# Patient Record
Sex: FEMALE | Race: WHITE | NOT HISPANIC OR LATINO | Employment: OTHER | URBAN - METROPOLITAN AREA
[De-identification: names, ages, dates, MRNs, and addresses within clinical notes are randomized per-mention and may not be internally consistent; named-entity substitution may affect disease eponyms.]

---

## 2017-12-28 PROBLEM — E66.9 OBESITY (BMI 30-39.9): Status: ACTIVE | Noted: 2017-12-28

## 2019-04-24 PROBLEM — R74.8 ELEVATED LIVER ENZYMES: Status: ACTIVE | Noted: 2019-04-24

## 2019-04-24 PROBLEM — R14.0 ABDOMINAL BLOATING: Status: ACTIVE | Noted: 2019-04-24

## 2019-04-24 PROBLEM — B08.5 ACUTE HERPANGINA: Status: ACTIVE | Noted: 2019-04-24

## 2019-04-24 PROBLEM — R10.9 RIGHT SIDED ABDOMINAL PAIN: Status: ACTIVE | Noted: 2019-04-24

## 2021-04-20 ENCOUNTER — HOSPITAL ENCOUNTER (OUTPATIENT)
Facility: MEDICAL CENTER | Age: 56
End: 2021-04-20
Attending: PATHOLOGY
Payer: COMMERCIAL

## 2021-04-20 DIAGNOSIS — Z00.6 RESEARCH STUDY PATIENT: ICD-10-CM

## 2021-04-30 LAB
ELF SCORE: 9
RELATIVE RISK: NORMAL
RISK GROUP: NORMAL
RISK: 3.3 %

## 2023-06-13 ENCOUNTER — TELEPHONE (OUTPATIENT)
Dept: CARDIOLOGY | Facility: MEDICAL CENTER | Age: 58
End: 2023-06-13

## 2023-06-13 NOTE — TELEPHONE ENCOUNTER
Caller: Antelmo    Topic/issue: Tawana is returning your call.     Callback Number: [phone]    Thank you,   Liss ALEXIS

## 2023-06-14 ENCOUNTER — RESEARCH ENCOUNTER (OUTPATIENT)
Dept: CARDIOLOGY | Facility: MEDICAL CENTER | Age: 58
End: 2023-06-14
Attending: INTERNAL MEDICINE
Payer: COMMERCIAL

## 2023-06-14 ENCOUNTER — TELEPHONE (OUTPATIENT)
Dept: CARDIOLOGY | Facility: MEDICAL CENTER | Age: 58
End: 2023-06-14

## 2023-06-14 VITALS
SYSTOLIC BLOOD PRESSURE: 122 MMHG | BODY MASS INDEX: 31.16 KG/M2 | WEIGHT: 187 LBS | RESPIRATION RATE: 16 BRPM | HEIGHT: 65 IN | HEART RATE: 88 BPM | OXYGEN SATURATION: 97 % | DIASTOLIC BLOOD PRESSURE: 88 MMHG

## 2023-06-14 DIAGNOSIS — Z00.6 RESEARCH STUDY PATIENT: Primary | ICD-10-CM

## 2023-06-14 DIAGNOSIS — E78.5 HYPERLIPIDEMIA, UNSPECIFIED HYPERLIPIDEMIA TYPE: ICD-10-CM

## 2023-06-14 DIAGNOSIS — I10 PRIMARY HYPERTENSION: ICD-10-CM

## 2023-06-14 DIAGNOSIS — Z00.6 RESEARCH STUDY PATIENT: ICD-10-CM

## 2023-06-14 PROCEDURE — 99213 OFFICE O/P EST LOW 20 MIN: CPT | Performed by: INTERNAL MEDICINE

## 2023-06-14 PROCEDURE — 3074F SYST BP LT 130 MM HG: CPT | Performed by: INTERNAL MEDICINE

## 2023-06-14 PROCEDURE — 3079F DIAST BP 80-89 MM HG: CPT | Performed by: INTERNAL MEDICINE

## 2023-06-14 PROCEDURE — 99212 OFFICE O/P EST SF 10 MIN: CPT | Performed by: INTERNAL MEDICINE

## 2023-06-14 PROCEDURE — 99203 OFFICE O/P NEW LOW 30 MIN: CPT | Performed by: INTERNAL MEDICINE

## 2023-06-14 PROCEDURE — 93005 ELECTROCARDIOGRAM TRACING: CPT | Performed by: INTERNAL MEDICINE

## 2023-06-14 ASSESSMENT — FIBROSIS 4 INDEX: FIB4 SCORE: 2.14

## 2023-06-14 NOTE — PROGRESS NOTES
CARDIOLOGY RESEARCH STUDY NOTE      Date of Visit: 6/14/2023    Primary Care Provider: Johana Lama D.O.    Patient Name: Tawana Thomas  YOB: 1965  MRN: [id number]     Reason for Visit:   Safety assessment for [place] Plaque study     Patient Story:   Tawana Thomas is a 58 year-old woman with a past medical history of hyperlipidemia and coronary artery disease.  She presents today for her safety assessment/intake physical examination to participate in the [place] Plaque study.    She reports feeling well today with no acute cardiac complaints.  She denies significant exertional shortness of breath or chest discomfort.  She denies any known history of prior myocardial infarction.  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  She reports good compliance and tolerance of her atorvastatin and ezetimibe, but has failed to achieve optimal LDL control on these.     Medications and Allergies:     Current Outpatient Medications   Medication Sig Dispense Refill    Calcium Carbonate-Vit D-Min (CALTRATE PLUS PO) Take 1,200 mg by mouth every day.      fluticasone (FLONASE) 50 MCG/ACT nasal spray 1 to 2 sprays per nostril daily for allergies 15.8 mL 2    OZEMPIC, 1 MG/DOSE, 4 MG/3ML Solution Pen-injector INJECT 1 MG UNDER THE SKIN EVERY 7 DAYS (3/14/2023) 3 mL 1    ezetimibe (ZETIA) 10 MG Tab Take 1 Tablet by mouth every day. 90 Tablet 3    hydroCHLOROthiazide (HYDRODIURIL) 12.5 MG tablet TAKE ONE TABLET BY MOUTH EVERY DAY 90 Tablet 1    atorvastatin (LIPITOR) 80 MG tablet TAKE ONE TABLET BY MOUTH EVERY EVENING 90 Tablet 1    levothyroxine (SYNTHROID) 100 MCG Tab TAKE ONE TABLET BY MOUTH EVERY DAY 90 Tablet 3    aspirin EC (ECOTRIN) 81 MG Tablet Delayed Response Take 1 Tablet by mouth every day. 30 Tablet     losartan (COZAAR) 100 MG Tab take 1 tablet by mouth once daily 90 Tablet 3    PREBIOTIC PRODUCT PO Take  by mouth.      Cholecalciferol (VITAMIN D3) 2000 UNIT CAPS Take 1 Cap by  "mouth every day.       No current facility-administered medications for this visit.     Allergies   Allergen Reactions    Penicillins     Sulfa Drugs       Medical Decision Making:   The patient was evaluated for inclusion in the [facility]   Plaque study.  She has no concerning physical examination findings or symptoms.  She meets inclusion/exclusion criteria for the study.  She has been scheduled for her screening CTCA.  Will order require baseline laboratories today.    Follow Up  Per study protocol     Cardiac Studies and Procedures:   CT/MRI  CAC (9/27/2022)  LM: 59  LAD:0  LCx: 0  RCA: 0  PDA: 0  The total Agatston CAC score is 59.    Electrophysiology  ECG (6/14/2023)  Normal sinus rhythm, early R wave transition     Vital Signs:   /88 (BP Location: Left arm, Patient Position: Sitting)   Pulse 88   Resp 16   Ht 1.651 m (5' 5\")   Wt 84.8 kg (187 lb)   SpO2 97%    BP Readings from Last 4 Encounters:   06/14/23 122/88   04/07/23 132/82   03/02/23 135/86   12/09/22 108/62     Wt Readings from Last 4 Encounters:   06/14/23 84.8 kg (187 lb)   04/07/23 85.7 kg (189 lb)   12/09/22 84.8 kg (187 lb)   12/08/22 83.9 kg (185 lb)     Body mass index is 31.12 kg/m².     Laboratories:   Lipids  Lab Results   Component Value Date/Time     (H) 04/21/2023 07:18 AM     (H) 08/31/2022 07:26 AM    LDL 99 12/27/2021 07:30 AM     (H) 04/27/2021 07:14 AM    LDL 86 11/13/2020 07:15 AM       Lab Results   Component Value Date/Time    HDL 80.0 (H) 04/21/2023 07:18 AM    HDL 77.0 (H) 08/31/2022 07:26 AM    HDL 70.0 (H) 12/27/2021 07:30 AM    HDL 54.0 04/27/2021 07:14 AM    HDL 61.0 (H) 11/13/2020 07:15 AM       Lab Results   Component Value Date/Time    TRIGLYCERIDE 77 04/21/2023 07:18 AM    TRIGLYCERIDE 85 08/31/2022 07:26 AM    TRIGLYCERIDE 149 12/27/2021 07:30 AM    TRIGLYCERIDE 116 04/27/2021 07:14 AM    TRIGLYCERIDE 127 11/13/2020 07:15 AM       Lab Results   Component Value Date/Time    CHOLSTRLTOT " 199 04/21/2023 07:18 AM    CHOLSTRLTOT 219 (H) 08/31/2022 07:26 AM    CHOLSTRLTOT 199 12/27/2021 07:30 AM    CHOLSTRLTOT 181 04/27/2021 07:14 AM    CHOLSTRLTOT 172 11/13/2020 07:15 AM       No components found for: LPA      Chemistries  Lab Results   Component Value Date/Time    CREATININE 0.7 04/21/2023 07:18 AM    CREATININE 0.8 08/31/2022 07:26 AM    CREATININE 0.7 12/27/2021 07:30 AM    CREATININE 0.9 04/27/2021 07:14 AM    CREATININE 0.9 11/13/2020 07:15 AM    CREATININE 0.71 05/10/2013 08:33 AM     Lab Results   Component Value Date/Time    BUN 12 04/21/2023 07:18 AM    BUN 12 08/31/2022 07:26 AM    BUN 13 12/27/2021 07:30 AM    BUN 12 04/27/2021 07:14 AM    BUN 8 11/13/2020 07:15 AM     Lab Results   Component Value Date/Time    POTASSIUM 3.7 04/21/2023 07:18 AM    POTASSIUM 4.3 08/31/2022 07:26 AM    POTASSIUM 4.0 12/27/2021 07:30 AM     Lab Results   Component Value Date/Time    SODIUM 135 (L) 04/21/2023 07:18 AM    SODIUM 139 08/31/2022 07:26 AM    SODIUM 136 12/27/2021 07:30 AM     Lab Results   Component Value Date/Time    GLUCOSE 86 04/21/2023 07:18 AM    GLUCOSE 96 08/31/2022 07:26 AM    GLUCOSE 88 12/27/2021 07:30 AM     Lab Results   Component Value Date/Time    ASTSGOT 71 (H) 04/21/2023 07:18 AM    ASTSGOT 97 (H) 08/31/2022 07:26 AM    ASTSGOT 89 (H) 12/27/2021 07:30 AM     Lab Results   Component Value Date/Time    ALTSGPT 55 04/21/2023 07:18 AM    ALTSGPT 68 08/31/2022 07:26 AM    ALTSGPT 74 12/27/2021 07:30 AM     Lab Results   Component Value Date/Time    ALKPHOSPHAT 109 04/21/2023 07:18 AM    ALKPHOSPHAT 119 (H) 08/31/2022 07:26 AM    ALKPHOSPHAT 134 (H) 12/27/2021 07:30 AM     Lab Results   Component Value Date/Time    HBA1C 5.6 12/20/2022 07:24 AM    HBA1C 5.4 08/31/2022 07:26 AM     Lab Results   Component Value Date/Time    TSH 2.860 10/22/2014 07:11 AM    TSH 2.490 03/17/2014 07:06 AM     No results found for: NTPROBNP  No results found for: TROPONINT    Blood Counts  Lab Results    Component Value Date/Time    HEMOGLOBIN 13.6 08/31/2022 07:26 AM    HEMOGLOBIN 12.8 (L) 12/27/2021 07:30 AM    HEMOGLOBIN 13.7 04/27/2021 07:14 AM     Lab Results   Component Value Date/Time    PLATELETCT 269 08/31/2022 07:26 AM    PLATELETCT 253 12/27/2021 07:30 AM    PLATELETCT 294 04/27/2021 07:14 AM     Lab Results   Component Value Date/Time    WBC 4.8 08/31/2022 07:26 AM    WBC 4.8 12/27/2021 07:30 AM    WBC 5.4 04/27/2021 07:14 AM        Physical Examination:   General: Well-appearing, no acute distress  Eyes: Extraocular movements intact, anicteric  Throat: Normal tongue alignment, clear oropharynx  Neck: Full range of motion, no jugular venous distension, no lymphadenopathy, no goiter  Ears: No lesions  Skin: No rashes on exposed skin, bandage over left mandible  Pulmonary: Normal respiratory effort, clear to auscultation bilaterally  Cardiovascular: Regular rate and rhythm, no murmurs or gallops appreciated, 2+ bilateral PT pulses  Gastrointestinal: Soft, non-tender, non-distended  Extremities: Warm and well perfused, no lower extremity edema  Neurological: Alert and oriented, no gross focal motor deficits  Psychiatric: Normal affect, normal judgment     Past History:   Past Medical History  The patient's past medical history was reviewed.  See HPI and self-reported patient medical history form for pertinent medical history to consultation.    Past Social History  The patient's social history was reviewed.  See [facility] self-reported patient medical history form for pertinent social history to consultation.    Past Family History  The patient's family history was reviewed.  See [facility] self-reported patient medical history form for pertinent family history to consultation.    Review of Systems  A pertinent cardiac review of systems was performed and was otherwise unremarkable except as per HPI and self-reported patient medical history form.        Gilles Clemens MD, [facility]  Interventional Cardiology  [facility]  [facility], [facility]  [address] [place] [address]  [place] [address]  Phone: [phone]  Fax: [phone]

## 2023-06-14 NOTE — RESEARCH NOTE
Name: Tawana Thomas   MRN: [id number]  Participant ID:  [id number]  : 1965  Visit Date/Time: 2023 10:00 AM      Study:      [id number] - [facility]aque   Status Consented/Enrolled (2023)   Active Start Date 23   Participant ID [id number]   Coordinator Lilia Fisher; Lilia Casillas; Kate Zepeda   IRB [id number]   NCT [id number]    Aaron Durán M.D.      Screening/Consent note:    Participation in the [facility] clinical trial was discussed with Tawana today. All aspects of the study purpose and procedures were explained.  She was given ample time to review the consent and all questions were answered to her satisfaction. Patient aware that the clinical trial is voluntary and she may withdraw consent at any time without affecting the level of care they receive.  Subject signed consent without coercion and undue influence and was given a copy of the signed consent. No study-related procedures took place prior to consenting and all assessments were conducted per protocol.     She did consent to have optional genetics sample be collected for this study.     Complete Physical Exam conducted by , see his note.     ConMeds and Med Hx reviewed. Tawana is currently on stable max statin of atorvastatin 80mg qd so she can skip the statin optimization period of study.     She is interested in the [employer] [facility] card program if CCTA qualifies.      At this point, she meets all inclusion and no exclusion criteria with CCTA to be scheduled. Pt is unavailable 7/3-    Vitals:  BP OMRON reading left arm after sitting for 5 minutes, 1-2 min apart  125/94 HR 90  110/88, HR 95  116/86, HR 94    Ht 165.1 cm  Wt 84.8 kg  Shoes were removed for HT and WT.    Labs:  Study labs will be drawn this week at a [facility] facility                Recent Results (from the past 336 hour(s))   EKG    Collection Time: 23 11:13 AM   Result Value Ref Range    Report       [facility]    Test Date:  2023  Pt Name:    RUDI GONZALEZ                Department: [facility]  MRN:        [id number]                      Room:  Gender:     Female                       Technician: GRAHAM  :        1965                   Requested By:DOMINGO LOMBARDI  Order #:    [id number]                    Reading MD:    Measurements  Intervals                                Axis  Rate:       88                           P:          -4  AR:         136                          QRS:        -17  QRSD:       86                           T:          11  QT:         350  QTc:        424    Interpretive Statements  Sinus rhythm  Borderline left axis deviation  Abnormal R-wave progression, early transition  No previous ECG available for comparison           Meds:      Current Outpatient Medications   Medication Sig Dispense Refill    Calcium Carbonate-Vit D-Min (CALTRATE PLUS PO) Take 1,200 mg by mouth every day.      fluticasone (FLONASE) 50 MCG/ACT nasal spray 1 to 2 sprays per nostril daily for allergies 15.8 mL 2    OZEMPIC, 1 MG/DOSE, 4 MG/3ML Solution Pen-injector INJECT 1 MG UNDER THE SKIN EVERY 7 DAYS (3/14/2023) 3 mL 1    ezetimibe (ZETIA) 10 MG Tab Take 1 Tablet by mouth every day. 90 Tablet 3    hydroCHLOROthiazide (HYDRODIURIL) 12.5 MG tablet TAKE ONE TABLET BY MOUTH EVERY DAY 90 Tablet 1    atorvastatin (LIPITOR) 80 MG tablet TAKE ONE TABLET BY MOUTH EVERY EVENING 90 Tablet 1    levothyroxine (SYNTHROID) 100 MCG Tab TAKE ONE TABLET BY MOUTH EVERY DAY 90 Tablet 3    aspirin EC (ECOTRIN) 81 MG Tablet Delayed Response Take 1 Tablet by mouth every day. 30 Tablet     losartan (COZAAR) 100 MG Tab take 1 tablet by mouth once daily 90 Tablet 3    PREBIOTIC PRODUCT PO Take  by mouth.      Cholecalciferol (VITAMIN D3) 2000 UNIT CAPS Take 1 Cap by mouth every day.       No current facility-administered medications for this visit.    current meds    History:    Past Medical History:   Diagnosis Date     "Childhood asthma     Difficulty with CPAP use     History of mammogram     2012    Hyperlipidemia     Hypertension     Hypothyroidism     Insulin resistance     Menorrhagia     Sleep apnea        Past Surgical History:   Procedure Laterality Date    ABDOMINAL HYSTERECTOMY TOTAL  3/23/15    ovaries spared    APPENDECTOMY      (1977)    MYOMECTOMY      (2001)    OTHER      \"Rollerball\" (2006)        Social History     Tobacco Use    Smoking status: Never    Smokeless tobacco: Never   Vaping Use    Vaping Use: Never used   Substance Use Topics    Alcohol use: Yes     Alcohol/week: 4.2 oz     Types: 7 Glasses of wine per week    Drug use: No       Family History   Problem Relation Age of Onset    Other Mother         Paget's disease of breast and alzheimer dementia    Heart Attack Mother         MI at 65    Other Father         MI    Heart Failure Brother     Other Other         Breast cancer (aunt)           Follow-up: CCTA/Baseline Visit to be scheduled      "

## 2023-06-14 NOTE — TELEPHONE ENCOUNTER
Patient in office for appointment. Faxed STAT records request to Dr. Feliberto Falk [facility]:    Fax# [phone]  Ph# [phone]      Dr. Trent  [place] :    Fax# [phone]  Ph# [phone]

## 2023-06-15 LAB — EKG IMPRESSION: NORMAL

## 2023-06-15 PROCEDURE — 93010 ELECTROCARDIOGRAM REPORT: CPT | Performed by: INTERNAL MEDICINE

## 2023-06-19 ENCOUNTER — HOSPITAL ENCOUNTER (OUTPATIENT)
Dept: LAB | Facility: MEDICAL CENTER | Age: 58
End: 2023-06-19
Attending: INTERNAL MEDICINE
Payer: COMMERCIAL

## 2023-06-19 DIAGNOSIS — Z00.6 RESEARCH STUDY PATIENT: ICD-10-CM

## 2023-06-19 LAB
ALT SERPL-CCNC: 36 U/L (ref 2–50)
AST SERPL-CCNC: 47 U/L (ref 12–45)
CREAT SERPL-MCNC: 0.6 MG/DL (ref 0.5–1.4)
GFR SERPLBLD CREATININE-BSD FMLA CKD-EPI: 102 ML/MIN/1.73 M 2

## 2023-06-19 PROCEDURE — 82565 ASSAY OF CREATININE: CPT

## 2023-06-19 PROCEDURE — 84450 TRANSFERASE (AST) (SGOT): CPT

## 2023-06-19 PROCEDURE — 36415 COLL VENOUS BLD VENIPUNCTURE: CPT

## 2023-06-19 PROCEDURE — 84460 ALANINE AMINO (ALT) (SGPT): CPT

## 2023-06-19 PROCEDURE — 83721 ASSAY OF BLOOD LIPOPROTEIN: CPT

## 2023-06-19 RX ORDER — METOPROLOL SUCCINATE 25 MG/1
25 TABLET, EXTENDED RELEASE ORAL PRN
Qty: 2 TABLET | Refills: 0 | Status: SHIPPED | OUTPATIENT
Start: 2023-06-19 | End: 2023-07-03 | Stop reason: SDUPTHER

## 2023-06-20 LAB — LDLC SERPL-MCNC: 100 MG/DL (ref 0–129)

## 2023-06-27 ENCOUNTER — APPOINTMENT (OUTPATIENT)
Dept: CARDIOLOGY | Facility: MEDICAL CENTER | Age: 58
End: 2023-06-27
Attending: INTERNAL MEDICINE
Payer: COMMERCIAL

## 2023-06-27 ENCOUNTER — HOSPITAL ENCOUNTER (OUTPATIENT)
Dept: RADIOLOGY | Facility: MEDICAL CENTER | Age: 58
End: 2023-06-27
Attending: INTERNAL MEDICINE
Payer: COMMERCIAL

## 2023-06-27 DIAGNOSIS — Z00.6 RESEARCH STUDY PATIENT: ICD-10-CM

## 2023-06-29 DIAGNOSIS — I10 PRIMARY HYPERTENSION: ICD-10-CM

## 2023-06-29 NOTE — TELEPHONE ENCOUNTER
Please advised , patient requesting refills for the following.     metoprolol SR (TOPROL XL) 25 MG TABLET SR 24 HR  Sig - Route: Take 1 Tablet by mouth as needed (Take one tablet the night before and one tablet the morning of your CT scan.). - Oral

## 2023-06-30 RX ORDER — METOPROLOL SUCCINATE 25 MG/1
25 TABLET, EXTENDED RELEASE ORAL PRN
Qty: 2 TABLET | Refills: 0 | OUTPATIENT
Start: 2023-06-30

## 2023-07-03 DIAGNOSIS — E78.5 HYPERLIPIDEMIA, UNSPECIFIED HYPERLIPIDEMIA TYPE: ICD-10-CM

## 2023-07-03 DIAGNOSIS — Z00.6 RESEARCH STUDY PATIENT: ICD-10-CM

## 2023-07-03 RX ORDER — METOPROLOL SUCCINATE 25 MG/1
25 TABLET, EXTENDED RELEASE ORAL PRN
Qty: 2 TABLET | Refills: 0 | Status: SHIPPED | OUTPATIENT
Start: 2023-07-03 | End: 2023-07-14 | Stop reason: SDUPTHER

## 2023-07-06 ENCOUNTER — HOSPITAL ENCOUNTER (OUTPATIENT)
Dept: RADIOLOGY | Facility: MEDICAL CENTER | Age: 58
End: 2023-07-06
Attending: INTERNAL MEDICINE

## 2023-07-06 ENCOUNTER — APPOINTMENT (OUTPATIENT)
Dept: CARDIOLOGY | Facility: MEDICAL CENTER | Age: 58
End: 2023-07-06
Attending: NURSE PRACTITIONER

## 2023-07-14 DIAGNOSIS — Z00.6 RESEARCH STUDY PATIENT: ICD-10-CM

## 2023-07-14 DIAGNOSIS — E78.5 HYPERLIPIDEMIA, UNSPECIFIED HYPERLIPIDEMIA TYPE: ICD-10-CM

## 2023-07-14 RX ORDER — METOPROLOL SUCCINATE 25 MG/1
25 TABLET, EXTENDED RELEASE ORAL PRN
Qty: 2 TABLET | Refills: 0 | Status: SHIPPED
Start: 2023-07-14 | End: 2023-07-20

## 2023-07-20 ENCOUNTER — HOSPITAL ENCOUNTER (OUTPATIENT)
Dept: RADIOLOGY | Facility: MEDICAL CENTER | Age: 58
End: 2023-07-20
Attending: INTERNAL MEDICINE

## 2023-07-20 ENCOUNTER — RESEARCH ENCOUNTER (OUTPATIENT)
Dept: CARDIOLOGY | Facility: MEDICAL CENTER | Age: 58
End: 2023-07-20

## 2023-07-20 ENCOUNTER — OFFICE VISIT (OUTPATIENT)
Dept: CARDIOLOGY | Facility: MEDICAL CENTER | Age: 58
End: 2023-07-20
Attending: NURSE PRACTITIONER
Payer: COMMERCIAL

## 2023-07-20 VITALS
WEIGHT: 190 LBS | BODY MASS INDEX: 31.65 KG/M2 | OXYGEN SATURATION: 95 % | RESPIRATION RATE: 18 BRPM | HEART RATE: 73 BPM | SYSTOLIC BLOOD PRESSURE: 118 MMHG | HEIGHT: 65 IN | DIASTOLIC BLOOD PRESSURE: 84 MMHG

## 2023-07-20 DIAGNOSIS — Z00.6 RESEARCH STUDY PATIENT: ICD-10-CM

## 2023-07-20 DIAGNOSIS — E78.5 HYPERLIPIDEMIA, UNSPECIFIED HYPERLIPIDEMIA TYPE: ICD-10-CM

## 2023-07-20 PROCEDURE — 700117 HCHG RX CONTRAST REV CODE 255: Performed by: INTERNAL MEDICINE

## 2023-07-20 PROCEDURE — 99214 OFFICE O/P EST MOD 30 MIN: CPT | Performed by: NURSE PRACTITIONER

## 2023-07-20 PROCEDURE — 3074F SYST BP LT 130 MM HG: CPT | Performed by: NURSE PRACTITIONER

## 2023-07-20 PROCEDURE — 99213 OFFICE O/P EST LOW 20 MIN: CPT | Performed by: NURSE PRACTITIONER

## 2023-07-20 PROCEDURE — 99212 OFFICE O/P EST SF 10 MIN: CPT | Performed by: NURSE PRACTITIONER

## 2023-07-20 PROCEDURE — 700102 HCHG RX REV CODE 250 W/ 637 OVERRIDE(OP): Performed by: RADIOLOGY

## 2023-07-20 PROCEDURE — 75574 CT ANGIO HRT W/3D IMAGE: CPT

## 2023-07-20 PROCEDURE — 3079F DIAST BP 80-89 MM HG: CPT | Performed by: NURSE PRACTITIONER

## 2023-07-20 PROCEDURE — A9270 NON-COVERED ITEM OR SERVICE: HCPCS | Performed by: RADIOLOGY

## 2023-07-20 RX ORDER — NITROGLYCERIN 0.4 MG/1
0.4 TABLET SUBLINGUAL
Status: DISCONTINUED | OUTPATIENT
Start: 2023-07-20 | End: 2023-07-21 | Stop reason: HOSPADM

## 2023-07-20 RX ADMIN — IOHEXOL 70 ML: 350 INJECTION, SOLUTION INTRAVENOUS at 10:45

## 2023-07-20 RX ADMIN — NITROGLYCERIN 0.4 MG: 0.4 TABLET, ORALLY DISINTEGRATING SUBLINGUAL at 08:43

## 2023-07-20 ASSESSMENT — FIBROSIS 4 INDEX: FIB4 SCORE: 1.75

## 2023-07-20 NOTE — PROGRESS NOTES
CTA EXAMINATION:  Patient had CTA.  Patient brought to preparation room.   Verbal consent given by patient for PIV and administration of NTG medications by RN.  PIV initiated, 20G R AC    Review of medications, protocol, and NPO status.   Education provided to patient regarding examination.  Patient given baseline 3 lead EK  HR: SR 72  BP: 140/94  RR 20   96% RA     Patient ready for CT scan  Vitals: 0840  BP:137/96  HR: SR 82  RR18  98% RA    Administration of Sublingual NTG given per CTA protocol at 0843--See MAR  Vitals: 0843  BP:118/77  HR: SR 75  RR18  97% RA    Vitals: 0850  BP:116/70  HR: SR 82  RR 20  97% RA    Patient returned to preparation area where post procedure education given. PIV removed, patient provided 1 large bottle of water.     Vitals returned to baseline. Patient stated ready to go back. Discharge education provided. Discharged per protocol.     Patient ambulated self, escorted by RN to [facility].

## 2023-07-20 NOTE — RESEARCH NOTE
Name: Tawana Thomas   MRN: [id number]  Participant ID:  [id number]  : 1965  Visit Date/Time: 2023 9:30 AM      Study:    [id number] - [facility] Plaque   Status Consented/Enrolled (2023)   Active Start Date 23   Participant ID [id number]   Coordinator Lilia Fisher; Lilia Casillas; Kate Zepeda   IRB [id number]   [facility] [id number]    Aaron Durán M.D.     Study Note:     Met with Tawana right after her study required CCTA scan. All required images were uploaded to the study [employer] website with confirmation they were sent.    This visit is outside of window due to CCTA on  cancelled because pt drank caffeine and rescheduled CCTA date cancelled because of [facility] CT machine being down.     She is feeling well with no AE's to report.     Physical exam done by CECIL Mccormick, see her note.    Vitals:  BP OMRON reading after sitting quietly for 5 minutes, 1-2 minutes apart  Sitting quietly starting 10:15am  153/109 HR 67 @ 10:20am  148/102 HR 65 @ 10:22am  135/99 HR 68 @ 10:24am    .1cm without shoes  WT 86.18 kg    Central Labs:    Drawn today per protocol at 9:20am. Ambient and refrigerated samples sent today tracking #[id number]. SOMA Plasma-1 and 2 not sent due to sample being completely hemolyzed and unable to separate plasma.  Frozen labs sent on 23 tracking # [id number].        Medical History with start dates:  Hypertension     Hyperlipidemia 16/MAY//2013  Hypothyroidism   Obesity            Meds:  ConMeds reviewed with the below start dates  Cozaar 100mg qd   HCTZ 12.5mg qd   Zetia 10mg qd 11/MAR/2016  Atorvastatin 80mg 11/MAR/2016  Levothyroxine 100mcg qd   Semaglutide 1mg qd 2/MAR/2023  Aspirin 81mg 9/DEC/2022      Current Outpatient Medications   Medication Sig Dispense Refill    Probiotic Product (PROBIOTIC PO) Take  by mouth every day.      Calcium Carbonate-Vit  "D-Min (CALTRATE PLUS PO) Take 1,200 mg by mouth every day.      fluticasone (FLONASE) 50 MCG/ACT nasal spray 1 to 2 sprays per nostril daily for allergies 15.8 mL 2    OZEMPIC, 1 MG/DOSE, 4 MG/3ML Solution Pen-injector INJECT 1 MG UNDER THE SKIN EVERY 7 DAYS (3/14/2023) 3 mL 1    ezetimibe (ZETIA) 10 MG Tab Take 1 Tablet by mouth every day. 90 Tablet 3    hydroCHLOROthiazide (HYDRODIURIL) 12.5 MG tablet TAKE ONE TABLET BY MOUTH EVERY DAY 90 Tablet 1    atorvastatin (LIPITOR) 80 MG tablet TAKE ONE TABLET BY MOUTH EVERY EVENING 90 Tablet 1    levothyroxine (SYNTHROID) 100 MCG Tab TAKE ONE TABLET BY MOUTH EVERY DAY 90 Tablet 3    aspirin EC (ECOTRIN) 81 MG Tablet Delayed Response Take 1 Tablet by mouth every day. 30 Tablet     losartan (COZAAR) 100 MG Tab take 1 tablet by mouth once daily 90 Tablet 3    PREBIOTIC PRODUCT PO Take  by mouth.      Cholecalciferol (VITAMIN D3) 2000 UNIT CAPS Take 1 Cap by mouth every day.       No current facility-administered medications for this visit.     Facility-Administered Medications Ordered in Other Visits   Medication Dose Route Frequency Provider Last Rate Last Admin    nitroglycerin (Nitrostat) tablet 0.4 mg  0.4 mg Sublingual Q5 MIN PRN Tammie Sanders M.D.   0.4 mg at 07/20/23 0843    current meds    History:    Past Medical History:   Diagnosis Date    Childhood asthma     Difficulty with CPAP use     History of mammogram     2012    Hyperlipidemia     Hypertension     Hypothyroidism     Insulin resistance     Menorrhagia     Sleep apnea        Past Surgical History:   Procedure Laterality Date    ABDOMINAL HYSTERECTOMY TOTAL  3/23/15    ovaries spared    APPENDECTOMY      (1977)    MYOMECTOMY      (2001)    OTHER      \"Rollerball\" (2006)        Social History     Tobacco Use    Smoking status: Never    Smokeless tobacco: Never   Vaping Use    Vaping Use: Never used   Substance Use Topics    Alcohol use: Yes     Alcohol/week: 4.2 oz     Types: 7 Glasses of wine per week "    Drug use: No       Family History   Problem Relation Age of Onset    Other Mother         Paget's disease of breast and alzheimer dementia    Heart Attack Mother         MI at 65    Other Father         MI    Heart Failure Brother     Other Other         Breast cancer (aunt)         Procedures:     EKG:   Results for orders placed or performed in visit on 23   EKG   Result Value Ref Range    Report       [facility] [place] [facility]    Test Date:  2023  Pt Name:    RUDI GONZALEZ                Department: [facility]  MRN:        [id number]                      Room:  Gender:     Female                       Technician: GRAHAM  :        1965                   Requested By:DOMINGO CLEMENS  Order #:    [id number]                    Reading MD: Domingo Clemens MD    Measurements  Intervals                                Axis  Rate:       88                           P:          -4  WA:         136                          QRS:        -17  QRSD:       86                           T:          11  QT:         350  QTc:        424    Interpretive Statements  Sinus rhythm  Abnormal R-wave progression, early transition, consider V2/V3 reversal  Electronically Signed On 6- 16:33:21 PDT by Domingo Clemens MD                 Follow-up: Randomization visit 8/3/23 if CCTA qualifies.

## 2023-07-20 NOTE — PROGRESS NOTES
[facility] Cardiology Note      Patient's PCP: Johana Lama D.O.    CC:   Chief Complaint   Patient presents with    Hyperlipidemia    Hypertension       HPI: aTwana Thomas 57 y.o. female for cardiology follow-up regarding elevated CAC score, premature family history of CAD, HTN, DLD.  Has additional medical problems of hypothyroid, history of rheumatic fever.    Cardiovascular Risk Factors:  1. Smoking status: Never  2. Type II Diabetes Mellitus: Not present.  We will check  3. Hypertension: Present  4. Dyslipidemia: Present  5. Family history of early Coronary Artery Disease in a first degree relative (Male less than 55 years of age; Female less than 65 years of age): Father MI at 65.  Mother sudden death at 75   6.  Obesity and/or Metabolic Syndrome: BMI 31  7. Sedentary lifestyle: Present  8. Substance Abuse (ETOH, Caffine, Recreational substances): Denies excessive use  9. Hx of CKD or autoimmune diseases (lupus or RA): Not present    Today, Patient feels well, denies chest pain, shortness of breath, palpitations, dizziness/lightheadedness, orthopnea, PND or Edema.     Patient is interested in [facility] plaque research trial for inclisiran versus placebo.  Patient undergoing current screening protocol.  CCTA completed this morning.  Questions regarding trial answered.  Questions regarding unverified vascular studies answered to the best my knowledge.  We will have patient follow-up per protocol.      Current list of administered Medications:    Current Outpatient Medications:     Probiotic Product (PROBIOTIC PO), Take  by mouth every day., Disp: , Rfl:     Calcium Carbonate-Vit D-Min (CALTRATE PLUS PO), Take 1,200 mg by mouth every day., Disp: , Rfl:     fluticasone (FLONASE) 50 MCG/ACT nasal spray, 1 to 2 sprays per nostril daily for allergies, Disp: 15.8 mL, Rfl: 2    OZEMPIC, 1 MG/DOSE, 4 MG/3ML Solution Pen-injector, INJECT 1 MG UNDER THE SKIN EVERY 7 DAYS (3/14/2023), Disp: 3 mL, Rfl: 1    ezetimibe (ZETIA)  "10 MG Tab, Take 1 Tablet by mouth every day., Disp: 90 Tablet, Rfl: 3    hydroCHLOROthiazide (HYDRODIURIL) 12.5 MG tablet, TAKE ONE TABLET BY MOUTH EVERY DAY, Disp: 90 Tablet, Rfl: 1    atorvastatin (LIPITOR) 80 MG tablet, TAKE ONE TABLET BY MOUTH EVERY EVENING, Disp: 90 Tablet, Rfl: 1    levothyroxine (SYNTHROID) 100 MCG Tab, TAKE ONE TABLET BY MOUTH EVERY DAY, Disp: 90 Tablet, Rfl: 3    aspirin EC (ECOTRIN) 81 MG Tablet Delayed Response, Take 1 Tablet by mouth every day., Disp: 30 Tablet, Rfl:     losartan (COZAAR) 100 MG Tab, take 1 tablet by mouth once daily, Disp: 90 Tablet, Rfl: 3    PREBIOTIC PRODUCT PO, Take  by mouth., Disp: , Rfl:     Cholecalciferol (VITAMIN D3) 2000 UNIT CAPS, Take 1 Cap by mouth every day., Disp: , Rfl:     metoprolol SR (TOPROL XL) 25 MG TABLET SR 24 HR, Take 1 Tablet by mouth as needed (Take one tablet the night before and one tablet the morning of your CT scan.). (Patient not taking: Reported on 7/20/2023), Disp: 2 Tablet, Rfl: 0  No current facility-administered medications for this visit.    Facility-Administered Medications Ordered in Other Visits:     nitroglycerin (Nitrostat) tablet 0.4 mg, 0.4 mg, Sublingual, Q5 MIN PRN, Tammie Sanders M.D., 0.4 mg at 07/20/23 0843    Past Medical History:   Diagnosis Date    Childhood asthma     Difficulty with CPAP use     History of mammogram     2012    Hyperlipidemia     Hypertension     Hypothyroidism     Insulin resistance     Menorrhagia     Sleep apnea        Past Surgical History:   Procedure Laterality Date    ABDOMINAL HYSTERECTOMY TOTAL  3/23/15    ovaries spared    APPENDECTOMY      (1977)    MYOMECTOMY      (2001)    OTHER      \"Rollerball\" (2006)       Family History   Problem Relation Age of Onset    Other Mother         Paget's disease of breast and alzheimer dementia    Heart Attack Mother         MI at 65    Other Father         MI    Heart Failure Brother     Other Other         Breast cancer (aunt)         Social " "History     Tobacco Use    Smoking status: Never    Smokeless tobacco: Never   Vaping Use    Vaping Use: Never used   Substance Use Topics    Alcohol use: Yes     Alcohol/week: 4.2 oz     Types: 7 Glasses of wine per week    Drug use: No       ALLERGIES:  Allergies   Allergen Reactions    Penicillins     Sulfa Drugs        Review of systems:  ROS negative except as noted in HPI      Physical exam:  /84 (BP Location: Left arm, Patient Position: Sitting, BP Cuff Size: Adult)   Pulse 73   Resp 18   Ht 1.651 m (5' 5\")   Wt 86.2 kg (190 lb)   LMP 11/29/2015   SpO2 95%   BMI 31.62 kg/m²     Physical Exam  Vitals reviewed.   Constitutional:       Appearance: Normal appearance. She is well-developed.   HENT:      Head: Normocephalic and atraumatic.   Eyes:      Extraocular Movements: Extraocular movements intact.      Conjunctiva/sclera: Conjunctivae normal.      Pupils: Pupils are equal, round, and reactive to light.   Neck:      Thyroid: No thyroid mass, thyromegaly or thyroid tenderness.      Vascular: No JVD.   Cardiovascular:      Rate and Rhythm: Normal rate and regular rhythm.      Pulses: Normal pulses.      Heart sounds: Normal heart sounds. No murmur heard.     No friction rub. No gallop.   Pulmonary:      Effort: Pulmonary effort is normal. No respiratory distress.      Breath sounds: Normal breath sounds.   Abdominal:      General: Bowel sounds are normal. There is no distension.      Palpations: Abdomen is soft.   Musculoskeletal:      Cervical back: Normal range of motion and neck supple.      Right lower leg: No edema.      Left lower leg: No edema.   Lymphadenopathy:      Head:      Right side of head: No tonsillar adenopathy.      Left side of head: No tonsillar adenopathy.      Cervical: No cervical adenopathy.      Upper Body:      Right upper body: No supraclavicular or axillary adenopathy.      Left upper body: No supraclavicular or axillary adenopathy.      Lower Body: No right inguinal " adenopathy. No left inguinal adenopathy.   Skin:     General: Skin is warm and dry.      Findings: No erythema.   Neurological:      General: No focal deficit present.      Mental Status: She is alert and oriented to person, place, and time. Mental status is at baseline.   Psychiatric:         Mood and Affect: Mood normal.         Behavior: Behavior normal.         Data:  Laboratory studies personally reviewed by me:  Recent Results (from the past 4032 hour(s))   Lipid Profile    Collection Time: 23  7:18 AM   Result Value Ref Range    Cholesterol,Tot 199 120 - 200 mg/dL    Triglycerides 77 0 - 150 mg/dL     (H) <100 mg/dL    HDL 80.0 (H) 40.0 - 60.0 mg/dL    Chol-Hdl Ratio 2.49     Non HDL Cholesterol 119 30 - 160   Comp Metabolic Panel    Collection Time: 23  7:18 AM   Result Value Ref Range    Sodium 135 (L) 136 - 145 mmol/L    Potassium 3.7 3.5 - 5.1 mmol/L    Chloride 96 (L) 98 - 107 mmol/L    Co2 26 21 - 32 mmol/L    Anion Gap 17 10 - 18 mmol/L    Glucose 86 74 - 99 mg/dL    Bun 12 7 - 18 mg/dL    Creatinine 0.7 0.6 - 1.0 mg/dL    Calcium 10.3 8.5 - 11.0 mg/dL    AST(SGOT) 71 (H) 15 - 37 U/L    ALT(SGPT) 55 12 - 78 U/L    Alkaline Phosphatase 109 46 - 116 U/L    Total Bilirubin 0.9 0.2 - 1.0 mg/dL    Albumin 4.2 3.4 - 5.0 g/dL    Total Protein 8.3 (H) 6.4 - 8.2 g/dL    A-G Ratio 1.0    ESTIMATED GFR    Collection Time: 23  7:18 AM   Result Value Ref Range    GFR (CKD-EPI) 99 >60 mL/min/1.73 m 2   EKG    Collection Time: 23 11:13 AM   Result Value Ref Range    Report       [facility] [place] [facility]    Test Date:  2023  Pt Name:    RUDI GONZALEZ                Department: [facility]  MRN:        [id number]                      Room:  Gender:     Female                       Technician: GRAHAM  :        1965                   Requested By:DOMINGO CLEMENS  Order #:    [id number]                    Reading MD: Domingo Clemens MD    Measurements  Intervals                                 Axis  Rate:       88                           P:          -4  TN:         136                          QRS:        -17  QRSD:       86                           T:          11  QT:         350  QTc:        424    Interpretive Statements  Sinus rhythm  Abnormal R-wave progression, early transition, consider V2/V3 reversal  Electronically Signed On 6- 16:33:21 PDT by Gilles Clemens MD     ASPARTATE AMINO-LOOMIS    Collection Time: 06/19/23  7:51 AM   Result Value Ref Range    AST(SGOT) 47 (H) 12 - 45 U/L   ALANINE AMINO-TRANS    Collection Time: 06/19/23  7:51 AM   Result Value Ref Range    ALT(SGPT) 36 2 - 50 U/L   CREATININE    Collection Time: 06/19/23  7:51 AM   Result Value Ref Range    Creatinine 0.60 0.50 - 1.40 mg/dL   LDL, DIRECT    Collection Time: 06/19/23  7:51 AM   Result Value Ref Range    LDL Direct 100 0 - 129 mg/dL   ESTIMATED GFR    Collection Time: 06/19/23  7:51 AM   Result Value Ref Range    GFR (CKD-EPI) 102 >60 mL/min/1.73 m 2       Lab Results   Component Value Date/Time    CHOLSTRLTOT 199 04/21/2023 07:18 AM     (H) 04/21/2023 07:18 AM    HDL 80.0 (H) 04/21/2023 07:18 AM    TRIGLYCERIDE 77 04/21/2023 07:18 AM       Lab Results   Component Value Date/Time    SODIUM 135 (L) 04/21/2023 07:18 AM    POTASSIUM 3.7 04/21/2023 07:18 AM    CHLORIDE 96 (L) 04/21/2023 07:18 AM    CO2 26 04/21/2023 07:18 AM    GLUCOSE 86 04/21/2023 07:18 AM    BUN 12 04/21/2023 07:18 AM    CREATININE 0.60 06/19/2023 07:51 AM    CREATININE 0.71 05/10/2013 08:33 AM    BUNCREATRAT 13 04/03/2015 09:25 AM    BUNCREATRAT 15 05/10/2013 08:33 AM     Lab Results   Component Value Date/Time    ALKPHOSPHAT 109 04/21/2023 07:18 AM    ASTSGOT 47 (H) 06/19/2023 07:51 AM    ALTSGPT 36 06/19/2023 07:51 AM    TBILIRUBIN 0.9 04/21/2023 07:18 AM      Coronary calcium scoring (9/27/2022):   VESSEL LEVEL SCORING:   LM: 59   LAD:0   LCx: 0   RCA: 0   PDA: 0     The total Agatston CAC score is 59.     CATEGORY     SCORE    CAC-DRS 0          0   CAC-DRS 1          1-99   CAC-DRS 2          100-299   CAC-DRS 3           >300     PERICARDIUM: There are no pericardial calcifications or effusion.     EXTRA-CORONARY CALCIFICATION: Mild aortic valve calcification. Mild thoracic aortic calcification. No mitral annular calcification.     OTHER FINDINGS: No other findings were present       All pertinent features of laboratory and imaging reviewed including primary images where applicable and noted above      1. Research study patient        2. Hyperlipidemia, unspecified hyperlipidemia type            Assessment / Plan / MDM:    Aortic atherosclerosis; mildly elevated coronary artery calcium scoring; dyslipidemia; hypertension; premature family history CAD  -CCTA completed this morning.  Pre-CT beta-blockers taken  -Continue aspirin and atorvastatin, Zetia, icosapent ethyl.  Goal LDL less than 70  -Blood pressure at goal in office today, continue losartan and HCTZ  -Continue [place] plaque research protocol.  Return to clinic once CCTA results reviewed.     FU in clinic in per research protocol. Sooner if needed.    Patient verbalizes understanding and agrees with the plan of care.       LARS Calhoun.   [facility]  [phone]    PLEASE NOTE: This Note was created using voice recognition Software. I have made every reasonable attempt to correct obvious errors, but I expect that there are errors of grammar and possibly content that I did not discover before finalizing the note

## 2023-07-20 NOTE — Clinical Note
KALI, patient proceeding with [facility] Plaque research protocol for Inclisiran VS Placebo for high risk ASCVD patients. If patient is enrolled, I am blinded to her lipid and vascular studies during the trial.  I recommend that she follows up with you if she wishes to proceed with carotid evaluation.  Please do not adjust her cholesterol medication at this time.

## 2023-08-03 ENCOUNTER — OFFICE VISIT (OUTPATIENT)
Dept: CARDIOLOGY | Facility: MEDICAL CENTER | Age: 58
End: 2023-08-03
Attending: NURSE PRACTITIONER
Payer: COMMERCIAL

## 2023-08-03 ENCOUNTER — RESEARCH ENCOUNTER (OUTPATIENT)
Dept: CARDIOLOGY | Facility: MEDICAL CENTER | Age: 58
End: 2023-08-03

## 2023-08-03 VITALS
RESPIRATION RATE: 16 BRPM | OXYGEN SATURATION: 99 % | BODY MASS INDEX: 31.16 KG/M2 | WEIGHT: 187 LBS | HEART RATE: 75 BPM | HEIGHT: 65 IN | SYSTOLIC BLOOD PRESSURE: 116 MMHG | DIASTOLIC BLOOD PRESSURE: 64 MMHG

## 2023-08-03 DIAGNOSIS — Z82.49 FAMILY HISTORY OF PREMATURE CAD: ICD-10-CM

## 2023-08-03 DIAGNOSIS — Z00.6 RESEARCH STUDY PATIENT: ICD-10-CM

## 2023-08-03 DIAGNOSIS — E78.5 HYPERLIPIDEMIA, UNSPECIFIED HYPERLIPIDEMIA TYPE: ICD-10-CM

## 2023-08-03 DIAGNOSIS — I70.0 AORTIC ATHEROSCLEROSIS (HCC): ICD-10-CM

## 2023-08-03 DIAGNOSIS — I10 PRIMARY HYPERTENSION: ICD-10-CM

## 2023-08-03 PROCEDURE — 3078F DIAST BP <80 MM HG: CPT | Performed by: NURSE PRACTITIONER

## 2023-08-03 PROCEDURE — 99214 OFFICE O/P EST MOD 30 MIN: CPT | Performed by: NURSE PRACTITIONER

## 2023-08-03 PROCEDURE — 99213 OFFICE O/P EST LOW 20 MIN: CPT | Performed by: NURSE PRACTITIONER

## 2023-08-03 PROCEDURE — 99212 OFFICE O/P EST SF 10 MIN: CPT | Performed by: NURSE PRACTITIONER

## 2023-08-03 PROCEDURE — 3074F SYST BP LT 130 MM HG: CPT | Performed by: NURSE PRACTITIONER

## 2023-08-03 RX ORDER — ICOSAPENT ETHYL 1000 MG/1
1000 CAPSULE ORAL 4 TIMES DAILY
COMMUNITY
Start: 2021-11-01

## 2023-08-03 ASSESSMENT — FIBROSIS 4 INDEX: FIB4 SCORE: 1.75

## 2023-08-03 NOTE — RESEARCH NOTE
Name: Tawana Thomas   MRN: [id number]  Participant ID:  [id number]  : 1965  Visit Date/Time: 8/3/2023 9:15 AM      Study:      [id number] - [place] Plaque   Status Consented/Enrolled (2023)   Active Start Date 23   Participant ID [id number]   Coordinator Lilia Fisher; Lilia Casillas; Kate Zepeda   IRB [id number]   NCT [id number]    Aaron Durán M.D.     Study Note:      Met with Tawana today for Day 1 Randomization visit. She has been well with no AE's or med changes since last visit.     CECIL Mccormick reviewed previous labs and conducted physical exam.    Study medication of Inclisiran or placebo Kit #[id number] injection given in right lateral abdomen at 10:03am by Veena Vela RN.     Patient study information card given with instructions to share with all of pts healthcare providers.    Patient declined pharmacy card for atorvastatin prescription. She understands to call with any questions or AE's.    Updated Med Hx and medications start dates according to Tawana:    Allergies    Fluticasone 50mcg/act nasal spray 2 sprays qd 23  Probiotic Product 1tab qd 2018  Prebiotic Product 1 tab qd   Cholecalfciferol 2000 unit caps 1 cap qd   Calcium Carbonate -Vit D 1200mg 1 tab qd UN/AUG/2022   Icosapent Ethyl 1000mg 1 tab 4 times a day     Vitals:  BP OMRON reading left arm after sitting quietly for 5 minutes, 1-2 minutes apart  Sitting quietly starting 9:35am  116/88 HR 84 @ 9:41am  121/83 HR 81 @ 9:43am  109/81 HR 86 @ 9:45am     WT 84.8 kg    Central Labs:    Labs drawn today and processed per protocol. Patient reports fasting for >10hours. Ambient/refrigerated samples sent today tracking #[id number]              Frozen labs sent today tracking #[phone]    SOMA Plasma-1 and MOA Plasma 2 drawn and sent today at Sponsor request due to sample taken at CCTA visit being unusable.      Meds:      Current Outpatient Medications   Medication Sig Dispense Refill    Icosapent Ethyl (VASCEPA) 1 g Cap Take 1,000 mg by mouth 4 times a day.      hydroCHLOROthiazide (HYDRODIURIL) 12.5 MG tablet TAKE ONE TABLET BY MOUTH EVERY DAY 90 Tablet 1    Probiotic Product (PROBIOTIC PO) Take  by mouth every day.      Calcium Carbonate-Vit D-Min (CALTRATE PLUS PO) Take 1,200 mg by mouth every day.      fluticasone (FLONASE) 50 MCG/ACT nasal spray 1 to 2 sprays per nostril daily for allergies 15.8 mL 2    OZEMPIC, 1 MG/DOSE, 4 MG/3ML Solution Pen-injector INJECT 1 MG UNDER THE SKIN EVERY 7 DAYS (3/14/2023) 3 mL 1    ezetimibe (ZETIA) 10 MG Tab Take 1 Tablet by mouth every day. 90 Tablet 3    atorvastatin (LIPITOR) 80 MG tablet TAKE ONE TABLET BY MOUTH EVERY EVENING 90 Tablet 1    levothyroxine (SYNTHROID) 100 MCG Tab TAKE ONE TABLET BY MOUTH EVERY DAY 90 Tablet 3    aspirin EC (ECOTRIN) 81 MG Tablet Delayed Response Take 1 Tablet by mouth every day. 30 Tablet     losartan (COZAAR) 100 MG Tab take 1 tablet by mouth once daily 90 Tablet 3    PREBIOTIC PRODUCT PO Take  by mouth.      Cholecalciferol (VITAMIN D3) 2000 UNIT CAPS Take 1 Cap by mouth every day.       Current Facility-Administered Medications   Medication Dose Route Frequency Provider Last Rate Last Admin    inclisiran sodium or PLACEBO (Study Drug) injection 300 mg  300 mg Subcutaneous Once LARS Calhoun.        current meds    Follow-up: 3 Month study visit 11/1/23

## 2023-08-03 NOTE — PROGRESS NOTES
Cardiology Progress Note      Patient's PCP: Johana Lama D.O.    CC:   Chief Complaint   Patient presents with    Hypertension     F/V Dx: Essential hypertension    Other     F/V Dx: Research study patient       HPI: Tawana Thomas 57 y.o. female for cardiology follow-up regarding elevated CAC score, premature family history of CAD, HTN, DLD.  Has additional medical problems of hypothyroid, history of rheumatic fever.    Cardiovascular Risk Factors:  1. Smoking status: Never  2. Type II Diabetes Mellitus: Not present.  We will check  3. Hypertension: Present  4. Dyslipidemia: Present  5. Family history of early Coronary Artery Disease in a first degree relative (Male less than 55 years of age; Female less than 65 years of age): Father MI at 65.  Mother sudden death at 75   6.  Obesity and/or Metabolic Syndrome: BMI 31  7. Sedentary lifestyle: Present  8. Substance Abuse (ETOH, Caffine, Recreational substances): Denies excessive use  9. Hx of CKD or autoimmune diseases (lupus or RA): Not present    Today, Patient feels well, she reports she completed a half marathon in June exacerbation of symptoms where she denies chest pain, shortness of breath, palpitations, dizziness/lightheadedness, orthopnea, PND or Edema.  Protocol lab work reviewed.    In collaboration with research coordinator, Lilia Fisher, patient fits all exclusion criteria no obvious exclusion criteria identified. ICF consent document explained in detail.  I explained my role as secondary investigator.  After thorough discussion of risks and benefits, all questions were answered.  Patient remains agreeable to participate.      Patient to receive initial dose today in office.    Current list of administered Medications:    Current Outpatient Medications:     Icosapent Ethyl (VASCEPA) 1 g Cap, Take 1,000 mg by mouth 4 times a day., Disp: , Rfl:     hydroCHLOROthiazide (HYDRODIURIL) 12.5 MG tablet, TAKE ONE TABLET BY MOUTH EVERY DAY, Disp: 90 Tablet,  "Rfl: 1    Probiotic Product (PROBIOTIC PO), Take  by mouth every day., Disp: , Rfl:     Calcium Carbonate-Vit D-Min (CALTRATE PLUS PO), Take 1,200 mg by mouth every day., Disp: , Rfl:     fluticasone (FLONASE) 50 MCG/ACT nasal spray, 1 to 2 sprays per nostril daily for allergies, Disp: 15.8 mL, Rfl: 2    OZEMPIC, 1 MG/DOSE, 4 MG/3ML Solution Pen-injector, INJECT 1 MG UNDER THE SKIN EVERY 7 DAYS (3/14/2023), Disp: 3 mL, Rfl: 1    ezetimibe (ZETIA) 10 MG Tab, Take 1 Tablet by mouth every day., Disp: 90 Tablet, Rfl: 3    atorvastatin (LIPITOR) 80 MG tablet, TAKE ONE TABLET BY MOUTH EVERY EVENING, Disp: 90 Tablet, Rfl: 1    levothyroxine (SYNTHROID) 100 MCG Tab, TAKE ONE TABLET BY MOUTH EVERY DAY, Disp: 90 Tablet, Rfl: 3    aspirin EC (ECOTRIN) 81 MG Tablet Delayed Response, Take 1 Tablet by mouth every day., Disp: 30 Tablet, Rfl:     losartan (COZAAR) 100 MG Tab, take 1 tablet by mouth once daily, Disp: 90 Tablet, Rfl: 3    PREBIOTIC PRODUCT PO, Take  by mouth., Disp: , Rfl:     Cholecalciferol (VITAMIN D3) 2000 UNIT CAPS, Take 1 Cap by mouth every day., Disp: , Rfl:     Current Facility-Administered Medications:     inclisiran sodium or PLACEBO (Study Drug) injection 300 mg, 300 mg, Subcutaneous, Once, AHMET CalhounPFloresitaRFloresitaN.    Past Medical History:   Diagnosis Date    Childhood asthma     Difficulty with CPAP use     History of mammogram     2012    Hyperlipidemia     Hypertension     Hypothyroidism     Insulin resistance     Menorrhagia     Sleep apnea        Past Surgical History:   Procedure Laterality Date    ABDOMINAL HYSTERECTOMY TOTAL  3/23/15    ovaries spared    APPENDECTOMY      (1977)    MYOMECTOMY      (2001)    OTHER      \"Rollerball\" (2006)       Family History   Problem Relation Age of Onset    Other Mother         Paget's disease of breast and alzheimer dementia    Heart Attack Mother         MI at 65    Other Father         MI    Heart Failure Brother     Other Other         Breast cancer " "(aunt)         Social History     Tobacco Use    Smoking status: Never    Smokeless tobacco: Never   Vaping Use    Vaping Use: Never used   Substance Use Topics    Alcohol use: Yes     Alcohol/week: 4.2 oz     Types: 7 Glasses of wine per week    Drug use: No       ALLERGIES:  Allergies   Allergen Reactions    Penicillins     Sulfa Drugs        Review of systems:  ROS negative except as noted in HPI      Physical exam:  /64 (BP Location: Left arm, Patient Position: Sitting, BP Cuff Size: Adult)   Pulse 75   Resp 16   Ht 1.651 m (5' 5\")   Wt 84.8 kg (187 lb)   LMP 11/29/2015   SpO2 99%   BMI 31.12 kg/m²     Physical Exam  Vitals reviewed.   Constitutional:       Appearance: She is well-developed.      Comments: BMI 31   HENT:      Head: Normocephalic and atraumatic.   Eyes:      Pupils: Pupils are equal, round, and reactive to light.   Neck:      Vascular: No JVD.   Cardiovascular:      Rate and Rhythm: Normal rate and regular rhythm.      Pulses: Normal pulses.   Pulmonary:      Effort: Pulmonary effort is normal. No respiratory distress.   Abdominal:      General: There is no distension.   Musculoskeletal:      Right lower leg: No edema.      Left lower leg: No edema.   Skin:     General: Skin is warm and dry.      Findings: No erythema.   Neurological:      General: No focal deficit present.      Mental Status: She is alert and oriented to person, place, and time.   Psychiatric:         Behavior: Behavior normal.         Data:  Laboratory studies personally reviewed by me:  Recent Results (from the past 4032 hour(s))   Lipid Profile    Collection Time: 04/21/23  7:18 AM   Result Value Ref Range    Cholesterol,Tot 199 120 - 200 mg/dL    Triglycerides 77 0 - 150 mg/dL     (H) <100 mg/dL    HDL 80.0 (H) 40.0 - 60.0 mg/dL    Chol-Hdl Ratio 2.49     Non HDL Cholesterol 119 30 - 160   Comp Metabolic Panel    Collection Time: 04/21/23  7:18 AM   Result Value Ref Range    Sodium 135 (L) 136 - 145 " mmol/L    Potassium 3.7 3.5 - 5.1 mmol/L    Chloride 96 (L) 98 - 107 mmol/L    Co2 26 21 - 32 mmol/L    Anion Gap 17 10 - 18 mmol/L    Glucose 86 74 - 99 mg/dL    Bun 12 7 - 18 mg/dL    Creatinine 0.7 0.6 - 1.0 mg/dL    Calcium 10.3 8.5 - 11.0 mg/dL    AST(SGOT) 71 (H) 15 - 37 U/L    ALT(SGPT) 55 12 - 78 U/L    Alkaline Phosphatase 109 46 - 116 U/L    Total Bilirubin 0.9 0.2 - 1.0 mg/dL    Albumin 4.2 3.4 - 5.0 g/dL    Total Protein 8.3 (H) 6.4 - 8.2 g/dL    A-G Ratio 1.0    ESTIMATED GFR    Collection Time: 23  7:18 AM   Result Value Ref Range    GFR (CKD-EPI) 99 >60 mL/min/1.73 m 2   EKG    Collection Time: 23 11:13 AM   Result Value Ref Range    Report       [facility]    Test Date:  2023  Pt Name:    RUDI GONZALEZ                Department: [facility]  MRN:        [id number]                      Room:  Gender:     Female                       Technician: GRAHAM  :        1965                   Requested By:DOMINGO CLEMENS  Order #:    [id number]                    Reading MD: Domingo Clemens MD    Measurements  Intervals                                Axis  Rate:       88                           P:          -4  IL:         136                          QRS:        -17  QRSD:       86                           T:          11  QT:         350  QTc:        424    Interpretive Statements  Sinus rhythm  Abnormal R-wave progression, early transition, consider V2/V3 reversal  Electronically Signed On 6- 16:33:21 PDT by Domingo Clemens MD     ASPARTATE AMINO-LOOMIS    Collection Time: 23  7:51 AM   Result Value Ref Range    AST(SGOT) 47 (H) 12 - 45 U/L   ALANINE AMINO-TRANS    Collection Time: 23  7:51 AM   Result Value Ref Range    ALT(SGPT) 36 2 - 50 U/L   CREATININE    Collection Time: 23  7:51 AM   Result Value Ref Range    Creatinine 0.60 0.50 - 1.40 mg/dL   LDL, DIRECT    Collection Time: 23  7:51 AM   Result Value Ref Range    LDL Direct 100 0 - 129 mg/dL    ESTIMATED GFR    Collection Time: 06/19/23  7:51 AM   Result Value Ref Range    GFR (CKD-EPI) 102 >60 mL/min/1.73 m 2       Lab Results   Component Value Date/Time    CHOLSTRLTOT 199 04/21/2023 07:18 AM     (H) 04/21/2023 07:18 AM    HDL 80.0 (H) 04/21/2023 07:18 AM    TRIGLYCERIDE 77 04/21/2023 07:18 AM       Lab Results   Component Value Date/Time    SODIUM 135 (L) 04/21/2023 07:18 AM    POTASSIUM 3.7 04/21/2023 07:18 AM    CHLORIDE 96 (L) 04/21/2023 07:18 AM    CO2 26 04/21/2023 07:18 AM    GLUCOSE 86 04/21/2023 07:18 AM    BUN 12 04/21/2023 07:18 AM    CREATININE 0.60 06/19/2023 07:51 AM    CREATININE 0.71 05/10/2013 08:33 AM    BUNCREATRAT 13 04/03/2015 09:25 AM    BUNCREATRAT 15 05/10/2013 08:33 AM     Lab Results   Component Value Date/Time    ALKPHOSPHAT 109 04/21/2023 07:18 AM    ASTSGOT 47 (H) 06/19/2023 07:51 AM    ALTSGPT 36 06/19/2023 07:51 AM    TBILIRUBIN 0.9 04/21/2023 07:18 AM      Coronary calcium scoring (9/27/2022):   VESSEL LEVEL SCORING:   LM: 59   LAD:0   LCx: 0   RCA: 0   PDA: 0     The total Agatston CAC score is 59.     CATEGORY     SCORE   CAC-DRS 0          0   CAC-DRS 1          1-99   CAC-DRS 2          100-299   CAC-DRS 3           >300     PERICARDIUM: There are no pericardial calcifications or effusion.     EXTRA-CORONARY CALCIFICATION: Mild aortic valve calcification. Mild thoracic aortic calcification. No mitral annular calcification.     OTHER FINDINGS: No other findings were present       All pertinent features of laboratory and imaging reviewed including primary images where applicable and noted above      1. Research study patient        2. Primary hypertension        3. Hyperlipidemia, unspecified hyperlipidemia type        4. Aortic atherosclerosis (HCC)        5. Family history of premature CAD              Assessment / Plan / MDM:    Aortic atherosclerosis; mildly elevated coronary artery calcium scoring; dyslipidemia; hypertension; premature family history  CAD  -CCTA completed this morning.  Pre-CT beta-blockers taken  -Continue aspirin and atorvastatin, Zetia, icosapent ethyl.  Lipids are now monitored per study protocol which will be blinded to this provider  -Blood pressure at goal in office today, continue losartan and HCTZ  -Continue [facility] plaque research protocol.  Initial dose to be given in office today.    FU in clinic in per research protocol. Sooner if needed.    Patient verbalizes understanding and agrees with the plan of care.       LARS Calhoun.   [facility]  [phone]    PLEASE NOTE: This Note was created using voice recognition Software. I have made every reasonable attempt to correct obvious errors, but I expect that there are errors of grammar and possibly content that I did not discover before finalizing the note

## 2023-11-01 ENCOUNTER — RESEARCH ENCOUNTER (OUTPATIENT)
Dept: CARDIOLOGY | Facility: MEDICAL CENTER | Age: 58
End: 2023-11-01
Attending: NURSE PRACTITIONER
Payer: COMMERCIAL

## 2023-11-01 VITALS
DIASTOLIC BLOOD PRESSURE: 90 MMHG | WEIGHT: 188.05 LBS | SYSTOLIC BLOOD PRESSURE: 121 MMHG | HEART RATE: 84 BPM | BODY MASS INDEX: 31.29 KG/M2

## 2023-11-01 DIAGNOSIS — I70.0 AORTIC ATHEROSCLEROSIS (HCC): ICD-10-CM

## 2023-11-01 DIAGNOSIS — E78.5 HYPERLIPIDEMIA, UNSPECIFIED HYPERLIPIDEMIA TYPE: ICD-10-CM

## 2023-11-01 DIAGNOSIS — Z00.6 RESEARCH STUDY PATIENT: ICD-10-CM

## 2023-11-01 DIAGNOSIS — Z82.49 FAMILY HISTORY OF PREMATURE CAD: ICD-10-CM

## 2023-11-01 PROCEDURE — 99214 OFFICE O/P EST MOD 30 MIN: CPT | Performed by: NURSE PRACTITIONER

## 2023-11-01 PROCEDURE — 99213 OFFICE O/P EST LOW 20 MIN: CPT | Performed by: NURSE PRACTITIONER

## 2023-11-01 ASSESSMENT — FIBROSIS 4 INDEX: FIB4 SCORE: 1.75

## 2023-11-01 NOTE — RESEARCH NOTE
Name: Tawana Thomas   MRN: [id number]  Participant ID:  [id number]  : 1965  Visit Date/Time: 2023 9:00 AM      Study:      [id number] - [facility]   Status Consented/Enrolled (2023)   Active Start Date 23   Participant ID [id number]   Coordinator Lilia Fisher; Lilia Casillas; Kate Zepeda   IRB [id number]   NCT [id number]    Aaron Durán M.D.     Assessment and Plan:    Met with Carrie today for Month 3 study visit. She reports no problems with last injection and no AE/SAEs. Only medication change was Ozempic dose increasing to 2mg qd on 23.    CECIL Mccormick, conducted physical exam. See her note.    Study medication of Inclisiran or placebo Kit #[id number] injection given in left lateral abdomen at 10:02am by Lilia Casillas.      Vitals:  BP OMRON reading left arm after sitting quietly for 5 minutes, 1-2 minutes apart  Sitting quietly starting 9:22am    Vitals:    23 0927 23 0929 23 0931   BP: (!) 124/93 120/85 (!) 121/90   BP Location: Left arm     Patient Position: Sitting     Pulse: 76 76 84   Weight: 85.3 kg (188 lb 0.8 oz)         Study Central Labs:      Patient reports fasting for >10hrs.  Labs drawn today at 9:41am, processed and shipped per protocol.    Ambient/refrigerated samples sent today tracking # [id number]  Frozen samples tracking #    Meds:    Current Outpatient Medications   Medication Sig Dispense Refill    Semaglutide, 1 MG/DOSE, (OZEMPIC, 1 MG/DOSE,) 2 MG/1.5ML Solution Pen-injector Inject 2 mg under the skin every 7 days. 6 mL 1    losartan (COZAAR) 100 MG Tab Take 1 Tablet by mouth every day. 90 Tablet 3    Semaglutide, 2 MG/DOSE, (OZEMPIC, 2 MG/DOSE,) 8 MG/3ML Solution Pen-injector Inject 2 mg under the skin every 7 days. 3 mL 2    Icosapent Ethyl (VASCEPA) 1 g Cap Take 1,000 mg by mouth 4 times a day.      hydroCHLOROthiazide (HYDRODIURIL) 12.5 MG tablet TAKE ONE TABLET BY MOUTH EVERY DAY 90 Tablet 1     Probiotic Product (PROBIOTIC PO) Take  by mouth every day.      Calcium Carbonate-Vit D-Min (CALTRATE PLUS PO) Take 1,200 mg by mouth every day.      fluticasone (FLONASE) 50 MCG/ACT nasal spray 1 to 2 sprays per nostril daily for allergies 15.8 mL 2    ezetimibe (ZETIA) 10 MG Tab Take 1 Tablet by mouth every day. 90 Tablet 3    atorvastatin (LIPITOR) 80 MG tablet TAKE ONE TABLET BY MOUTH EVERY EVENING 90 Tablet 1    levothyroxine (SYNTHROID) 100 MCG Tab TAKE ONE TABLET BY MOUTH EVERY DAY 90 Tablet 3    aspirin EC (ECOTRIN) 81 MG Tablet Delayed Response Take 1 Tablet by mouth every day. 30 Tablet     PREBIOTIC PRODUCT PO Take  by mouth.      Cholecalciferol (VITAMIN D3) 2000 UNIT CAPS Take 1 Cap by mouth every day.       No current facility-administered medications for this visit.    current meds               Follow-up: 6 Month visit due 4/29/24 to be scheduled

## 2023-11-08 NOTE — PROGRESS NOTES
Cardiology Progress Note      Patient's PCP: Johana Lama D.O.    CC:   No chief complaint on file.  Research follow-up    HPI: Tawana Thomas 57 y.o. female for cardiology follow-up regarding elevated CAC score, premature family history of CAD, HTN, DLD.  Has additional medical problems of hypothyroid, history of rheumatic fever.    Cardiovascular Risk Factors:  1. Smoking status: Never  2. Type II Diabetes Mellitus: Not present.  We will check  3. Hypertension: Present  4. Dyslipidemia: Present  5. Family history of early Coronary Artery Disease in a first degree relative (Male less than 55 years of age; Female less than 65 years of age): Father MI at 65.  Mother sudden death at 75   6.  Obesity and/or Metabolic Syndrome: BMI 31  7. Sedentary lifestyle: Present  8. Substance Abuse (ETOH, Caffine, Recreational substances): Denies excessive use  9. Hx of CKD or autoimmune diseases (lupus or RA): Not present    Today, Patient feels well, she reports she completed another half marathon in [facility] last month without exacerbation of symptoms where she denies chest pain, shortness of breath, palpitations, dizziness/lightheadedness, orthopnea, myalgia, PND or Edema.  Patient denies injection site abnormalities after initial dose.  Patient appears euvolemic on exam.    Patient to receive second dose today in office.    Current list of administered Medications:    Current Outpatient Medications:     Semaglutide, 1 MG/DOSE, (OZEMPIC, 1 MG/DOSE,) 2 MG/1.5ML Solution Pen-injector, Inject 2 mg under the skin every 7 days., Disp: 6 mL, Rfl: 1    losartan (COZAAR) 100 MG Tab, Take 1 Tablet by mouth every day., Disp: 90 Tablet, Rfl: 3    Semaglutide, 2 MG/DOSE, (OZEMPIC, 2 MG/DOSE,) 8 MG/3ML Solution Pen-injector, Inject 2 mg under the skin every 7 days., Disp: 3 mL, Rfl: 2    Icosapent Ethyl (VASCEPA) 1 g Cap, Take 1,000 mg by mouth 4 times a day., Disp: , Rfl:     hydroCHLOROthiazide (HYDRODIURIL) 12.5 MG tablet, TAKE ONE  "TABLET BY MOUTH EVERY DAY, Disp: 90 Tablet, Rfl: 1    Probiotic Product (PROBIOTIC PO), Take  by mouth every day., Disp: , Rfl:     Calcium Carbonate-Vit D-Min (CALTRATE PLUS PO), Take 1,200 mg by mouth every day., Disp: , Rfl:     fluticasone (FLONASE) 50 MCG/ACT nasal spray, 1 to 2 sprays per nostril daily for allergies, Disp: 15.8 mL, Rfl: 2    ezetimibe (ZETIA) 10 MG Tab, Take 1 Tablet by mouth every day., Disp: 90 Tablet, Rfl: 3    atorvastatin (LIPITOR) 80 MG tablet, TAKE ONE TABLET BY MOUTH EVERY EVENING, Disp: 90 Tablet, Rfl: 1    levothyroxine (SYNTHROID) 100 MCG Tab, TAKE ONE TABLET BY MOUTH EVERY DAY, Disp: 90 Tablet, Rfl: 3    aspirin EC (ECOTRIN) 81 MG Tablet Delayed Response, Take 1 Tablet by mouth every day., Disp: 30 Tablet, Rfl:     PREBIOTIC PRODUCT PO, Take  by mouth., Disp: , Rfl:     Cholecalciferol (VITAMIN D3) 2000 UNIT CAPS, Take 1 Cap by mouth every day., Disp: , Rfl:     Past Medical History:   Diagnosis Date    Childhood asthma     Difficulty with CPAP use     History of mammogram     2012    Hyperlipidemia     Hypertension     Hypothyroidism     Insulin resistance     Menorrhagia     Sleep apnea        Past Surgical History:   Procedure Laterality Date    ABDOMINAL HYSTERECTOMY TOTAL  3/23/15    ovaries spared    APPENDECTOMY      (1977)    MYOMECTOMY      (2001)    OTHER      \"Rollerball\" (2006)       Family History   Problem Relation Age of Onset    Other Mother         Paget's disease of breast and alzheimer dementia    Heart Attack Mother         MI at 65    Other Father         MI    Heart Failure Brother     Other Other         Breast cancer (aunt)         Social History     Tobacco Use    Smoking status: Never    Smokeless tobacco: Never   Vaping Use    Vaping Use: Never used   Substance Use Topics    Alcohol use: Yes     Alcohol/week: 4.2 oz     Types: 7 Glasses of wine per week    Drug use: No       ALLERGIES:  Allergies   Allergen Reactions    Penicillins     Sulfa Drugs  "       Review of systems:  ROS negative except as noted in HPI      Physical exam:  LMP 2015     Physical Exam  Vitals reviewed.   Constitutional:       Appearance: She is well-developed.      Comments: BMI 31   HENT:      Head: Normocephalic and atraumatic.   Eyes:      Pupils: Pupils are equal, round, and reactive to light.   Neck:      Vascular: No JVD.   Cardiovascular:      Rate and Rhythm: Normal rate and regular rhythm.      Pulses: Normal pulses.   Pulmonary:      Effort: Pulmonary effort is normal. No respiratory distress.   Abdominal:      General: There is no distension.   Musculoskeletal:      Right lower leg: No edema.      Left lower leg: No edema.   Skin:     General: Skin is warm and dry.      Findings: No erythema.   Neurological:      General: No focal deficit present.      Mental Status: She is alert and oriented to person, place, and time.   Psychiatric:         Behavior: Behavior normal.         Data:  Laboratory studies personally reviewed by me:  Recent Results (from the past 4032 hour(s))   EKG    Collection Time: 23 11:13 AM   Result Value Ref Range    Report       [facility]    Test Date:  2023  Pt Name:    RUDI GONZALEZ                [facility]  MRN:        [id number]                      Room:  Gender:     Female                       Technician: GRAHAM  :        1965                   Requested By:DOMINGO CLEMENS  Order #:    [id number]                    Reading MD: Domingo Clemens MD    Measurements  Intervals                                Axis  Rate:       88                           P:          -4  OH:         136                          QRS:        -17  QRSD:       86                           T:          11  QT:         350  QTc:        424    Interpretive Statements  Sinus rhythm  Abnormal R-wave progression, early transition, consider V2/V3 reversal  Electronically Signed On 6- 16:33:21 PDT by Domingo Clemens MD     ASPARTATE  AMINO-LOOMIS    Collection Time: 06/19/23  7:51 AM   Result Value Ref Range    AST(SGOT) 47 (H) 12 - 45 U/L   ALANINE AMINO-TRANS    Collection Time: 06/19/23  7:51 AM   Result Value Ref Range    ALT(SGPT) 36 2 - 50 U/L   CREATININE    Collection Time: 06/19/23  7:51 AM   Result Value Ref Range    Creatinine 0.60 0.50 - 1.40 mg/dL   LDL, DIRECT    Collection Time: 06/19/23  7:51 AM   Result Value Ref Range    LDL Direct 100 0 - 129 mg/dL   ESTIMATED GFR    Collection Time: 06/19/23  7:51 AM   Result Value Ref Range    GFR (CKD-EPI) 102 >60 mL/min/1.73 m 2       Lab Results   Component Value Date/Time    CHOLSTRLTOT 199 04/21/2023 07:18 AM     (H) 04/21/2023 07:18 AM    HDL 80.0 (H) 04/21/2023 07:18 AM    TRIGLYCERIDE 77 04/21/2023 07:18 AM       Lab Results   Component Value Date/Time    SODIUM 135 (L) 04/21/2023 07:18 AM    POTASSIUM 3.7 04/21/2023 07:18 AM    CHLORIDE 96 (L) 04/21/2023 07:18 AM    CO2 26 04/21/2023 07:18 AM    GLUCOSE 86 04/21/2023 07:18 AM    BUN 12 04/21/2023 07:18 AM    CREATININE 0.60 06/19/2023 07:51 AM    CREATININE 0.71 05/10/2013 08:33 AM    BUNCREATRAT 13 04/03/2015 09:25 AM    BUNCREATRAT 15 05/10/2013 08:33 AM     Lab Results   Component Value Date/Time    ALKPHOSPHAT 109 04/21/2023 07:18 AM    ASTSGOT 47 (H) 06/19/2023 07:51 AM    ALTSGPT 36 06/19/2023 07:51 AM    TBILIRUBIN 0.9 04/21/2023 07:18 AM      Coronary calcium scoring (9/27/2022):   VESSEL LEVEL SCORING:   LM: 59   LAD:0   LCx: 0   RCA: 0   PDA: 0     The total Agatston CAC score is 59.     CATEGORY     SCORE   CAC-DRS 0          0   CAC-DRS 1          1-99   CAC-DRS 2          100-299   CAC-DRS 3           >300     PERICARDIUM: There are no pericardial calcifications or effusion.     EXTRA-CORONARY CALCIFICATION: Mild aortic valve calcification. Mild thoracic aortic calcification. No mitral annular calcification.     OTHER FINDINGS: No other findings were present       All pertinent features of laboratory and imaging  reviewed including primary images where applicable and noted above      1. Research study patient        2. Aortic atherosclerosis (HCC)        3. Family history of premature CAD        4. Hyperlipidemia, unspecified hyperlipidemia type              Assessment / Plan / MDM:    Aortic atherosclerosis; mildly elevated coronary artery calcium scoring; dyslipidemia; hypertension; premature family history CAD  -Continue aspirin and atorvastatin, Zetia, icosapent ethyl.  Lipids are now monitored per study protocol which will be blinded to this provider  -Blood pressure at goal in office today, continue losartan and HCTZ  -Continue [facility] plaque research protocol.  Second dose dose to be given in office today.    FU in clinic in per research protocol. Sooner if needed.    Patient verbalizes understanding and agrees with the plan of care.     ERASTO CalhounRDEJAH.   [facility]  [phone]    PLEASE NOTE: This Note was created using voice recognition Software. I have made every reasonable attempt to correct obvious errors, but I expect that there are errors of grammar and possibly content that I did not discover before finalizing the note

## 2024-05-06 NOTE — PROGRESS NOTES
Cardiology Progress Note      Patient's PCP: Johana Lama D.O.    CC:   Chief Complaint   Patient presents with    Hypertension    Hyperlipidemia   Research follow-up    HPI: Tawana Thomas 57 y.o. female for cardiology follow-up regarding elevated CAC score, premature family history of CAD, HTN, DLD.  Has additional medical problems of hypothyroid, history of rheumatic fever.    Cardiovascular Risk Factors:  1. Smoking status: Never  2. Type II Diabetes Mellitus: Not present.  We will check  3. Hypertension: Present  4. Dyslipidemia: Present  5. Family history of early Coronary Artery Disease in a first degree relative (Male less than 55 years of age; Female less than 65 years of age): Father MI at 65.  Mother sudden death at 75   6.  Obesity and/or Metabolic Syndrome: BMI 31  7. Sedentary lifestyle: Present  8. Substance Abuse (ETOH, Caffine, Recreational substances): Denies excessive use  9. Hx of CKD or autoimmune diseases (lupus or RA): Not present    Patient feels well, denies chest pain, shortness of breath, palpitations, dizziness/lightheadedness, orthopnea, PND or Edema.  Patient reports she discontinued her Ozempic as she did not find desired weight loss while on the medication.  Patient also noted mild nocturnal polydipsia while on this medication.  However, with continued positive lifestyle changes, patient has lost another 4 pounds.  Patient denies any adverse reactions due to current medical therapy.  She continues to power walk.    Patient to receive 3 dose today in office.    Current list of administered Medications:    Current Outpatient Medications:     hydroCHLOROthiazide 12.5 MG tablet, TAKE ONE TABLET BY MOUTH EVERY DAY, Disp: 90 Tablet, Rfl: 1    inclisiran sodium or PLACEBO (STUDY DRUG) 300 mg/1.5 mL injection, Inject 300 mg under the skin every 6 months. Given in clinic, Disp: , Rfl:     levothyroxine (SYNTHROID) 100 MCG Tab, TAKE ONE TABLET BY MOUTH EVERY DAY, Disp: 90 Tablet, Rfl: 1     "ezetimibe (ZETIA) 10 MG Tab, TAKE ONE TABLET BY MOUTH EVERY DAY, Disp: 90 Tablet, Rfl: 1    atorvastatin (LIPITOR) 80 MG tablet, Take 1 Tablet by mouth every evening., Disp: 90 Tablet, Rfl: 1    losartan (COZAAR) 100 MG Tab, Take 1 Tablet by mouth every day., Disp: 90 Tablet, Rfl: 3    Probiotic Product (PROBIOTIC PO), Take  by mouth every day., Disp: , Rfl:     Calcium Carbonate-Vit D-Min (CALTRATE PLUS PO), Take 1,200 mg by mouth every day., Disp: , Rfl:     aspirin EC (ECOTRIN) 81 MG Tablet Delayed Response, Take 1 Tablet by mouth every day., Disp: 30 Tablet, Rfl:     PREBIOTIC PRODUCT PO, Take  by mouth., Disp: , Rfl:     Cholecalciferol (VITAMIN D3) 2000 UNIT CAPS, Take 1 Cap by mouth every day., Disp: , Rfl:     Current Facility-Administered Medications:     inclisiran sodium or PLACEBO (Study Drug) injection 300 mg, 300 mg, Subcutaneous, Once, AHMET CalhounPFloresitaRFloresitaNFloresita    Past Medical History:   Diagnosis Date    Childhood asthma     Difficulty with CPAP use     History of mammogram     2012    Hyperlipidemia     Hypertension     Hypothyroidism     Insulin resistance     Menorrhagia     Sleep apnea        Past Surgical History:   Procedure Laterality Date    ABDOMINAL HYSTERECTOMY TOTAL  3/23/15    ovaries spared    APPENDECTOMY      (1977)    MYOMECTOMY      (2001)    OTHER      \"Rollerball\" (2006)       Family History   Problem Relation Age of Onset    Other Mother         Paget's disease of breast and alzheimer dementia    Heart Attack Mother         MI at 65    Other Father         MI    Heart Failure Brother     Other Other         Breast cancer (aunt)         Social History     Tobacco Use    Smoking status: Never    Smokeless tobacco: Never   Vaping Use    Vaping Use: Never used   Substance Use Topics    Alcohol use: Yes     Alcohol/week: 4.2 oz     Types: 7 Glasses of wine per week    Drug use: No       ALLERGIES:  Allergies   Allergen Reactions    Penicillins     Sulfa Drugs        Review of " "systems:  ROS negative except as noted in HPI      Physical exam:  /80 (BP Location: Left arm, Patient Position: Sitting, BP Cuff Size: Adult)   Pulse 76   Ht 1.651 m (5' 5\")   Wt 83.5 kg (184 lb)   LMP 11/29/2015   SpO2 96%   BMI 30.62 kg/m²     Physical Exam    All study assessment results reviewed.    Complete Physical Exam  GENERAL APPEARANCE: Appears healthy.  Alert; in no acute distress.  Pleasant.,   HEAD: negative,   EYES: Anicteric, conjunctivae/corneas clear. PERRLA. EOMI. Fundi benign.,   EARS: negative, External ears normal. Canals clear. TM's normal.,   NOSE: negative,   THROAT: Lips, mucosa, and tongue normal. Teeth and gums normal. Oropharynx moist and without lesion ,   NECK: Neck supple. No adenopathy. Thyroid symmetric, normal size, and without nodularity,   BACK: negative,   LUNGS: negative,   CARDIOVASCULAR: PMI normal. No lifts, heaves, or thrills. RRR. No murmurs, clicks, gallops, or rubs,   ABDOMEN: Abdomen soft, non-tender. BS normal. No masses,  No organomegaly,   EXTREMITIES: Extremities normal. No deformities, edema, or skin discoloration,  PULSES: negative,   SKIN: negative,   LYMPH NODES: No palpable lymph nodes,   NEUROLOGIC: negative    Data:  Laboratory studies personally reviewed by me:  No results found for this or any previous visit (from the past 4032 hour(s)).      Lab Results   Component Value Date/Time    CHOLSTRLTOT 199 04/21/2023 07:18 AM     (H) 04/21/2023 07:18 AM    HDL 80.0 (H) 04/21/2023 07:18 AM    TRIGLYCERIDE 77 04/21/2023 07:18 AM       Lab Results   Component Value Date/Time    SODIUM 135 (L) 04/21/2023 07:18 AM    POTASSIUM 3.7 04/21/2023 07:18 AM    CHLORIDE 96 (L) 04/21/2023 07:18 AM    CO2 26 04/21/2023 07:18 AM    GLUCOSE 86 04/21/2023 07:18 AM    BUN 12 04/21/2023 07:18 AM    CREATININE 0.60 06/19/2023 07:51 AM    CREATININE 0.71 05/10/2013 08:33 AM    BUNCREATRAT 13 04/03/2015 09:25 AM    BUNCREATRAT 15 05/10/2013 08:33 AM     Lab Results "   Component Value Date/Time    ALKPHOSPHAT 109 04/21/2023 07:18 AM    ASTSGOT 47 (H) 06/19/2023 07:51 AM    ALTSGPT 36 06/19/2023 07:51 AM    TBILIRUBIN 0.9 04/21/2023 07:18 AM      Coronary calcium scoring (9/27/2022):   VESSEL LEVEL SCORING:   LM: 59   LAD:0   LCx: 0   RCA: 0   PDA: 0     The total Agatston CAC score is 59.     CATEGORY     SCORE   CAC-DRS 0          0   CAC-DRS 1          1-99   CAC-DRS 2          100-299   CAC-DRS 3           >300     PERICARDIUM: There are no pericardial calcifications or effusion.     EXTRA-CORONARY CALCIFICATION: Mild aortic valve calcification. Mild thoracic aortic calcification. No mitral annular calcification.     OTHER FINDINGS: No other findings were present       All pertinent features of laboratory and imaging reviewed including primary images where applicable and noted above      1. Aortic atherosclerosis (HCC)        2. Family history of premature CAD        3. Primary hypertension        4. Research study patient                Assessment / Plan / MDM:    Aortic atherosclerosis; mildly elevated coronary artery calcium scoring; dyslipidemia; hypertension; premature family history CAD  -Continue aspirin and atorvastatin, Zetia.  Lipids are now monitored per study protocol which will be blinded to this provider  -Blood pressure at goal in office today, continue losartan and HCTZ  -Continue [facility] plaque research protocol.  Third dose dose to be given in office today.    FU in clinic in per research protocol. Sooner if needed.    Patient verbalizes understanding and agrees with the plan of care.     LARS Calhoun.   [facility]  [phone]    PLEASE NOTE: This Note was created using voice recognition Software. I have made every reasonable attempt to correct obvious errors, but I expect that there are errors of grammar and possibly content that I did not discover before finalizing the note

## 2024-05-07 ENCOUNTER — RESEARCH ENCOUNTER (OUTPATIENT)
Dept: CARDIOLOGY | Facility: MEDICAL CENTER | Age: 59
End: 2024-05-07
Attending: NURSE PRACTITIONER
Payer: COMMERCIAL

## 2024-05-07 VITALS
HEART RATE: 76 BPM | WEIGHT: 184 LBS | DIASTOLIC BLOOD PRESSURE: 80 MMHG | OXYGEN SATURATION: 96 % | HEIGHT: 65 IN | BODY MASS INDEX: 30.66 KG/M2 | SYSTOLIC BLOOD PRESSURE: 122 MMHG

## 2024-05-07 VITALS
WEIGHT: 184.08 LBS | SYSTOLIC BLOOD PRESSURE: 122 MMHG | HEART RATE: 74 BPM | DIASTOLIC BLOOD PRESSURE: 89 MMHG | BODY MASS INDEX: 30.63 KG/M2

## 2024-05-07 DIAGNOSIS — Z00.6 RESEARCH STUDY PATIENT: ICD-10-CM

## 2024-05-07 DIAGNOSIS — I70.0 AORTIC ATHEROSCLEROSIS (HCC): ICD-10-CM

## 2024-05-07 DIAGNOSIS — I10 PRIMARY HYPERTENSION: ICD-10-CM

## 2024-05-07 DIAGNOSIS — Z82.49 FAMILY HISTORY OF PREMATURE CAD: ICD-10-CM

## 2024-05-07 PROCEDURE — 3074F SYST BP LT 130 MM HG: CPT | Performed by: NURSE PRACTITIONER

## 2024-05-07 PROCEDURE — 99214 OFFICE O/P EST MOD 30 MIN: CPT | Performed by: NURSE PRACTITIONER

## 2024-05-07 PROCEDURE — 3079F DIAST BP 80-89 MM HG: CPT | Performed by: NURSE PRACTITIONER

## 2024-05-07 ASSESSMENT — FIBROSIS 4 INDEX
FIB4 SCORE: 1.78
FIB4 SCORE: 1.78

## 2024-05-07 NOTE — RESEARCH NOTE
Name: Tawana Thomas   MRN: [id number]  : 1965  Visit Date/Time: 2024 08:15 AM      Study:      [id number] - [facility]   Status Consented/Enrolled (2023)   Active Start Date 23   Participant ID [id number]   Coordinator Lilia Fisher; Lilia Casillas; Kate Zepeda   IRB [id number]   NCT [id number]    Aaron Durán M.D.     Assessment and Plan:     Met with Carrie today for Month 9 study visit. She reports no problems with last injection and no AE/SAEs. Only medication change was Ozempic stopped 2024.     CECIL Mccormick, conducted physical exam. See her note.     Study medication of Inclisiran or placebo Kit #[id number] injection given in lright lateral abdomen at 9:00am by Lilia Turcios.    Vitals: BP OMRON reading left arm after sitting quietly for 5 minutes, 1-2 minutes apart  Sitting quietly starting    Vitals:    24 0846 24 0848 24 0850   BP: (!) 129/90 (!) 123/91 122/89   BP Location: Left arm     Patient Position: Sitting     Pulse: 71 75 74   Weight: 83.5 kg (184 lb 1.4 oz)         Study Central Labs:      Patient reports last meal was 7pm yesterday, however she drank some juice today before visit. Pt reminded to only have water for >10hrs prior to study lab draw.    Labs drawn today at 8:55 am, processed and shipped per protocol.     Refrigerated samples sent today tracking # [id number]  Frozen samples tracking # [id number]    Meds:    Current Outpatient Medications   Medication Sig Dispense Refill    hydroCHLOROthiazide 12.5 MG tablet TAKE ONE TABLET BY MOUTH EVERY DAY 90 Tablet 1    inclisiran sodium or PLACEBO (STUDY DRUG) 300 mg/1.5 mL injection Inject 300 mg under the skin every 6 months. Given in clinic      levothyroxine (SYNTHROID) 100 MCG Tab TAKE ONE TABLET BY MOUTH EVERY DAY 90 Tablet 1    ezetimibe (ZETIA) 10 MG Tab TAKE ONE TABLET BY MOUTH EVERY DAY 90 Tablet 1    atorvastatin (LIPITOR) 80 MG tablet Take 1  Tablet by mouth every evening. 90 Tablet 1    losartan (COZAAR) 100 MG Tab Take 1 Tablet by mouth every day. 90 Tablet 3    Probiotic Product (PROBIOTIC PO) Take  by mouth every day.      Calcium Carbonate-Vit D-Min (CALTRATE PLUS PO) Take 1,200 mg by mouth every day.      aspirin EC (ECOTRIN) 81 MG Tablet Delayed Response Take 1 Tablet by mouth every day. 30 Tablet     PREBIOTIC PRODUCT PO Take  by mouth.      Cholecalciferol (VITAMIN D3) 2000 UNIT CAPS Take 1 Cap by mouth every day.       Current Facility-Administered Medications   Medication Dose Route Frequency Provider Last Rate Last Admin    inclisiran sodium or PLACEBO (Study Drug) injection 300 mg  300 mg Subcutaneous Once AHMET CalhounP.R.N.        current meds    Follow-up: 15M Visit 10/23/24

## 2024-10-29 ENCOUNTER — RESEARCH ENCOUNTER (OUTPATIENT)
Dept: CARDIOLOGY | Facility: MEDICAL CENTER | Age: 59
End: 2024-10-29
Attending: PHYSICIAN ASSISTANT
Payer: COMMERCIAL

## 2024-10-29 ENCOUNTER — RESEARCH ENCOUNTER (OUTPATIENT)
Dept: CARDIOLOGY | Facility: MEDICAL CENTER | Age: 59
End: 2024-10-29

## 2024-10-29 VITALS
DIASTOLIC BLOOD PRESSURE: 84 MMHG | BODY MASS INDEX: 30.49 KG/M2 | WEIGHT: 183 LBS | HEART RATE: 82 BPM | SYSTOLIC BLOOD PRESSURE: 136 MMHG | HEIGHT: 65 IN | RESPIRATION RATE: 18 BRPM | OXYGEN SATURATION: 98 %

## 2024-10-29 VITALS
WEIGHT: 182.98 LBS | BODY MASS INDEX: 30.45 KG/M2 | DIASTOLIC BLOOD PRESSURE: 96 MMHG | HEART RATE: 83 BPM | SYSTOLIC BLOOD PRESSURE: 142 MMHG

## 2024-10-29 DIAGNOSIS — Z82.49 FAMILY HISTORY OF PREMATURE CAD: ICD-10-CM

## 2024-10-29 DIAGNOSIS — I70.0 AORTIC ATHEROSCLEROSIS (HCC): ICD-10-CM

## 2024-10-29 DIAGNOSIS — E78.5 HYPERLIPIDEMIA, UNSPECIFIED HYPERLIPIDEMIA TYPE: ICD-10-CM

## 2024-10-29 PROCEDURE — 99212 OFFICE O/P EST SF 10 MIN: CPT | Performed by: PHYSICIAN ASSISTANT

## 2024-10-29 ASSESSMENT — ENCOUNTER SYMPTOMS
COUGH: 0
PALPITATIONS: 0
SHORTNESS OF BREATH: 0
ORTHOPNEA: 0
PND: 0
DIZZINESS: 0

## 2024-10-29 ASSESSMENT — FIBROSIS 4 INDEX
FIB4 SCORE: 1.87
FIB4 SCORE: 1.87

## 2025-04-14 ASSESSMENT — ENCOUNTER SYMPTOMS
ORTHOPNEA: 0
COUGH: 0
PALPITATIONS: 0
SHORTNESS OF BREATH: 0
PND: 0
DIZZINESS: 0

## 2025-04-14 NOTE — PROGRESS NOTES
Chief Complaint   Patient presents with    Follow-Up     Hypertension          Subjective:   Tawana Thomas is a 60 y.o. female who presents today for 21 month evaluation for [facility] study.       Current medical problems include elevated CAC score, family history of premature CAD, HTN, DLD, TAA of 4.5cm in 2023. Has additional medical problems of hypothyroid, history of rheumatic fever.    Tawana is doing well, she denies myalgias, skin reactions, exertional symptoms.     Her blood pressure was elevated and her primary added amlodipine 2.5mg which has controlled the readings well. She continues on HCTZ and losartan, interested in a combination pill.         Past Medical History:   Diagnosis Date    Asthma 1965    Childhood asthma    Bronchitis 2018    Cancer (HCC) 2015, 2023, 2024    Skin - two melanoma    Cataract 2019    Childhood asthma     Difficulty with CPAP use     History of mammogram     2012    Hyperlipidemia     Hypertension     Hypothyroidism     Insulin resistance     Menorrhagia     Sleep apnea          Family History   Problem Relation Age of Onset    Other Mother         Paget's disease of breast and alzheimer dementia    Heart Attack Mother         MI at 65    Cancer Mother     Other Father         MI    Hypertension Father     Heart Failure Brother     Other Other         Breast cancer (aunt)    Stroke Maternal Grandmother     Cancer Maternal Aunt          Social History     Tobacco Use    Smoking status: Never    Smokeless tobacco: Never   Vaping Use    Vaping status: Never Used   Substance Use Topics    Alcohol use: Yes     Alcohol/week: 4.2 oz     Types: 7 Glasses of wine per week     Comment: one glass of wine with dinner    Drug use: No         Allergies   Allergen Reactions    Penicillins     Sulfa Drugs          Current Outpatient Medications   Medication Sig    CALCIUM PO Take  by mouth.    Olmesartan-amLODIPine-HCTZ 20-5-12.5 MG Tab Take 1 Tablet by mouth every day.     "levothyroxine (SYNTHROID) 100 MCG Tab TAKE ONE TABLET BY MOUTH EVERY DAY    amLODIPine (NORVASC) 2.5 MG Tab Take 1 Tablet by mouth every day. TAKE ONE TABLET BY MOUTH EVERY DAY MAY INCREASE TO TAKE TWO TABLETS BY MOUTH EVERY DAY IF BLOOD PRESSURE NOT LESS THAN 135/85 AFTER 7 TO 10 DAYS    atorvastatin (LIPITOR) 80 MG tablet TAKE ONE TABLET BY MOUTH EVERY EVENING    ezetimibe (ZETIA) 10 MG Tab TAKE ONE TABLET BY MOUTH EVERY DAY    losartan (COZAAR) 100 MG Tab TAKE ONE TABLET BY MOUTH EVERY DAY    hydroCHLOROthiazide 12.5 MG tablet Take 1 Tablet by mouth every day.    Cyanocobalamin (B-12 PO) Take 1 Tablet by mouth every day.    inclisiran sodium or PLACEBO (STUDY DRUG) 300 mg/1.5 mL injection Inject 300 mg under the skin every 6 months. Given in clinic    Probiotic Product (PROBIOTIC PO) Take  by mouth every day.    Cholecalciferol (VITAMIN D3) 2000 UNIT CAPS Take 1 Cap by mouth every day.    buPROPion (WELLBUTRIN XL) 150 MG XL tablet Take 1 Tablet by mouth every morning. (Patient not taking: Reported on 4/15/2025)         Review of Systems   Respiratory:  Negative for cough and shortness of breath.    Cardiovascular:  Negative for chest pain, palpitations, orthopnea, leg swelling and PND.   Skin:  Negative for rash.   Neurological:  Negative for dizziness.          Objective:   /74 (BP Location: Left arm, Patient Position: Sitting, BP Cuff Size: Adult)   Pulse 74   Resp 16   Ht 1.651 m (5' 5\")   Wt 85.7 kg (189 lb)   SpO2 96%  Body mass index is 31.45 kg/m².      Complete Physical Exam  GENERAL APPEARANCE: Appears healthy.  Alert; in no acute distress.  Pleasant.,   HEAD: Normocephalic. No masses, lesions, tenderness or abnormalities,   EYES: Anicteric, conjunctivae/corneas clear. PERRLA. EOMI. Fundi benign.,   EARS: negative, External ears normal. Canals clear. TM's normal.,   NOSE: negative,   THROAT: Lips, mucosa, and tongue normal. Teeth and gums normal. Oropharynx moist and without lesion ,   NECK: " Neck supple. No adenopathy.   BACK:  ROM normal.   LUNGS: Good diaphragmatic excursion. Lungs clear to auscultation bilaterally  CARDIOVASCULAR: PMI normal. No lifts, heaves, or thrills. RRR. No murmurs, clicks, gallops, or rubs,   ABDOMEN: Abdomen soft, non-tender.   EXTREMITIES: Moves all 4 extremities spontaneously  PULSES: radial 2+ bilaterally  SKIN: Skin color, texture, normal. No rashes or lesions.,   NEUROLOGIC: Gait normal. Sensation grossly normal        Assessment:     1. Primary hypertension  Olmesartan-amLODIPine-HCTZ 20-5-12.5 MG Tab      2. Research study patient  CT-CTA HEART W/3D IMAGE      3. Aortic atherosclerosis (HCC)        4. Family history of premature CAD               Medical Decision Making:  Today's Assessment / Plan:     Aortic atherosclerosis; elevated coronary artery calcium scoring; dyslipidemia; hypertension; premature family history CAD  -Continue aspirin and atorvastatin, Zetia.  Lipids are now monitored per study protocol which will be blinded to this provider  -Blood pressure at goal in office today, we can try to switch to a combination pill of olmesartan-Amlo-HCTZ if covered by insurance.   -Continue [facility] plaque research protocol.     TTA 4.5cm  - repeat CTA at end of study in 3 months.      FU in clinic in per research protocol. Sooner if needed.     No follow-ups on file.  Sooner if problems.

## 2025-04-15 ENCOUNTER — RESEARCH ENCOUNTER (OUTPATIENT)
Dept: CARDIOLOGY | Facility: MEDICAL CENTER | Age: 60
End: 2025-04-15
Attending: PHYSICIAN ASSISTANT
Payer: COMMERCIAL

## 2025-04-15 VITALS
SYSTOLIC BLOOD PRESSURE: 126 MMHG | WEIGHT: 188.93 LBS | BODY MASS INDEX: 31.44 KG/M2 | HEART RATE: 74 BPM | DIASTOLIC BLOOD PRESSURE: 74 MMHG

## 2025-04-15 VITALS
WEIGHT: 189 LBS | OXYGEN SATURATION: 96 % | RESPIRATION RATE: 16 BRPM | HEART RATE: 74 BPM | HEIGHT: 65 IN | BODY MASS INDEX: 31.49 KG/M2 | SYSTOLIC BLOOD PRESSURE: 122 MMHG | DIASTOLIC BLOOD PRESSURE: 74 MMHG

## 2025-04-15 DIAGNOSIS — Z82.49 FAMILY HISTORY OF PREMATURE CAD: ICD-10-CM

## 2025-04-15 DIAGNOSIS — Z00.6 RESEARCH STUDY PATIENT: ICD-10-CM

## 2025-04-15 DIAGNOSIS — I10 PRIMARY HYPERTENSION: ICD-10-CM

## 2025-04-15 DIAGNOSIS — I70.0 AORTIC ATHEROSCLEROSIS (HCC): ICD-10-CM

## 2025-04-15 PROCEDURE — 99213 OFFICE O/P EST LOW 20 MIN: CPT | Mod: Q1 | Performed by: PHYSICIAN ASSISTANT

## 2025-04-15 PROCEDURE — 99213 OFFICE O/P EST LOW 20 MIN: CPT | Performed by: PHYSICIAN ASSISTANT

## 2025-04-15 RX ORDER — OLMESARTAN MEDOXOMIL, AMLODIPINE AND HYDROCHLOROTHIAZIDE TABLET 20/5/12.5 MG 20; 5; 12.5 MG/1; MG/1; MG/1
1 TABLET ORAL DAILY
Qty: 100 TABLET | Refills: 3 | Status: SHIPPED | OUTPATIENT
Start: 2025-04-15

## 2025-04-15 ASSESSMENT — FIBROSIS 4 INDEX
FIB4 SCORE: 1.9
FIB4 SCORE: 1.9

## 2025-04-15 NOTE — RESEARCH NOTE
Name: Tawana Thomas   MRN: [id number]  Participant ID:  [id number]  : 1965  Visit Date/Time: 4/15/2025 9:00 AM      Study:    [id number] - [facility]   Status Consented/Enrolled (2023)   Active Start Date 23   Participant ID [id number]   Coordinator Lilia Fisher; Kate Zepeda;   IRB [id number]   NCT [id number]    Aaron Durán M.D.     Re-Consent note:     Updated study consent form V 06 was discussed with patient today. All aspects of the study purpose and procedures were explained along with noted changes. Subject was given ample time to review the consent and all questions were answered to their satisfaction. Patient aware that the clinical trial is voluntary and they may withdraw consent at any time without affecting the level of care they receive.  Subject signed the new consent without coercion and undue influence and was given a copy of the signed consent. No study-related procedures took place prior to consenting and all assessments were conducted per protocol     Study Note:    Met with Carrie today for Month 21 study visit. She reports no problems with last injection, no AE/SAEs.      She took buproprion XL 150mg qd [id number]. She had also taken this in  for depressed mood.    Amlodipine 2.5mg qd [id number] for hypertension.      JAQUI Langford,  conducted physical exam. See her note.  Lifestyle changes discussed including diet and exercise.     Study medication of Inclisiran or placebo Kit #[id number] injection given in left lateral abdomen at 9:30am by myself, Lilia Fisher.     Central Labs:        Pt confirms fasting today for >10 hours.    Labs drawn at 9:27 am, processed and shipped per protocol.        Refrigerated sample tracking # [id number]    Vitals:BP OMRON reading right arm after sitting quietly for 5 minutes, 1-2 minutes apart  Sitting quietly starting 9:10am.    Vitals:    04/15/25 0915 04/15/25 0916 04/15/25 0918    BP: 129/76 127/74 126/74   BP Location: Left arm     Patient Position: Sitting     Pulse: 77 71 74   Weight: 85.7 kg (188 lb 15 oz)             Follow-up: 24M/EOS visit and CCTA due 7/23/25.

## 2025-05-27 PROBLEM — M81.0 AGE-RELATED OSTEOPOROSIS WITHOUT CURRENT PATHOLOGICAL FRACTURE: Status: ACTIVE | Noted: 2025-05-27

## 2025-07-09 ENCOUNTER — RESEARCH ENCOUNTER (OUTPATIENT)
Dept: CARDIOLOGY | Facility: MEDICAL CENTER | Age: 60
End: 2025-07-09
Attending: INTERNAL MEDICINE
Payer: COMMERCIAL

## 2025-07-09 ENCOUNTER — TELEPHONE (OUTPATIENT)
Dept: CARDIOLOGY | Facility: MEDICAL CENTER | Age: 60
End: 2025-07-09
Payer: COMMERCIAL

## 2025-07-09 DIAGNOSIS — Z00.6 RESEARCH STUDY PATIENT: Primary | ICD-10-CM

## 2025-07-09 DIAGNOSIS — I70.0 AORTIC ATHEROSCLEROSIS (HCC): ICD-10-CM

## 2025-07-09 RX ORDER — METOPROLOL SUCCINATE 25 MG/1
25 TABLET, EXTENDED RELEASE ORAL SEE ADMIN INSTRUCTIONS
Qty: 2 TABLET | Refills: 0 | Status: SHIPPED
Start: 2025-07-09 | End: 2025-07-14

## 2025-07-14 ENCOUNTER — RESEARCH ENCOUNTER (OUTPATIENT)
Dept: CARDIOLOGY | Facility: MEDICAL CENTER | Age: 60
End: 2025-07-14
Attending: PHYSICIAN ASSISTANT
Payer: COMMERCIAL

## 2025-07-14 ENCOUNTER — RESULTS FOLLOW-UP (OUTPATIENT)
Dept: CARDIOLOGY | Facility: MEDICAL CENTER | Age: 60
End: 2025-07-14

## 2025-07-14 ENCOUNTER — HOSPITAL ENCOUNTER (OUTPATIENT)
Dept: RADIOLOGY | Facility: MEDICAL CENTER | Age: 60
End: 2025-07-14
Attending: PHYSICIAN ASSISTANT
Payer: COMMERCIAL

## 2025-07-14 ENCOUNTER — RESEARCH ENCOUNTER (OUTPATIENT)
Dept: CARDIOLOGY | Facility: MEDICAL CENTER | Age: 60
End: 2025-07-14
Attending: INTERNAL MEDICINE
Payer: COMMERCIAL

## 2025-07-14 VITALS
DIASTOLIC BLOOD PRESSURE: 72 MMHG | HEART RATE: 108 BPM | HEIGHT: 65 IN | BODY MASS INDEX: 32.32 KG/M2 | RESPIRATION RATE: 16 BRPM | SYSTOLIC BLOOD PRESSURE: 102 MMHG | OXYGEN SATURATION: 98 % | WEIGHT: 194 LBS

## 2025-07-14 VITALS
SYSTOLIC BLOOD PRESSURE: 125 MMHG | HEART RATE: 99 BPM | WEIGHT: 194 LBS | BODY MASS INDEX: 32.28 KG/M2 | DIASTOLIC BLOOD PRESSURE: 74 MMHG

## 2025-07-14 VITALS — SYSTOLIC BLOOD PRESSURE: 138 MMHG | HEART RATE: 68 BPM | DIASTOLIC BLOOD PRESSURE: 81 MMHG

## 2025-07-14 DIAGNOSIS — Z00.6 RESEARCH STUDY PATIENT: Primary | ICD-10-CM

## 2025-07-14 DIAGNOSIS — I70.0 AORTIC ATHEROSCLEROSIS (HCC): ICD-10-CM

## 2025-07-14 DIAGNOSIS — E78.5 HYPERLIPIDEMIA, UNSPECIFIED HYPERLIPIDEMIA TYPE: ICD-10-CM

## 2025-07-14 DIAGNOSIS — Z00.6 RESEARCH STUDY PATIENT: ICD-10-CM

## 2025-07-14 DIAGNOSIS — Z82.49 FAMILY HISTORY OF PREMATURE CAD: ICD-10-CM

## 2025-07-14 PROCEDURE — A9270 NON-COVERED ITEM OR SERVICE: HCPCS

## 2025-07-14 PROCEDURE — 99213 OFFICE O/P EST LOW 20 MIN: CPT | Performed by: PHYSICIAN ASSISTANT

## 2025-07-14 PROCEDURE — 75574 CT ANGIO HRT W/3D IMAGE: CPT

## 2025-07-14 PROCEDURE — 700117 HCHG RX CONTRAST REV CODE 255: Performed by: PHYSICIAN ASSISTANT

## 2025-07-14 PROCEDURE — 700102 HCHG RX REV CODE 250 W/ 637 OVERRIDE(OP)

## 2025-07-14 RX ORDER — NITROGLYCERIN 0.4 MG/1
TABLET SUBLINGUAL
Status: COMPLETED
Start: 2025-07-14 | End: 2025-07-14

## 2025-07-14 RX ORDER — NITROGLYCERIN 0.4 MG/1
0.4 TABLET SUBLINGUAL ONCE
Status: COMPLETED | OUTPATIENT
Start: 2025-07-14 | End: 2025-07-14

## 2025-07-14 RX ORDER — METOPROLOL TARTRATE 1 MG/ML
5 INJECTION, SOLUTION INTRAVENOUS
Status: DISCONTINUED | OUTPATIENT
Start: 2025-07-14 | End: 2025-07-15 | Stop reason: HOSPADM

## 2025-07-14 RX ORDER — METOPROLOL TARTRATE 1 MG/ML
5 INJECTION, SOLUTION INTRAVENOUS
Status: DISCONTINUED | OUTPATIENT
Start: 2025-07-15 | End: 2025-07-15 | Stop reason: HOSPADM

## 2025-07-14 RX ORDER — SODIUM CHLORIDE 9 MG/ML
500 INJECTION, SOLUTION INTRAVENOUS
Status: ACTIVE | OUTPATIENT
Start: 2025-07-14 | End: 2025-07-14

## 2025-07-14 RX ORDER — ACETAMINOPHEN 325 MG/1
650 TABLET ORAL
Status: SHIPPED | OUTPATIENT
Start: 2025-07-14 | End: 2025-07-14

## 2025-07-14 RX ORDER — METOPROLOL TARTRATE 100 MG/1
100 TABLET ORAL
Status: SHIPPED | OUTPATIENT
Start: 2025-07-14 | End: 2025-07-14

## 2025-07-14 RX ADMIN — NITROGLYCERIN 0.4 MG: 0.4 TABLET SUBLINGUAL at 08:04

## 2025-07-14 RX ADMIN — IOHEXOL 150 ML: 350 INJECTION, SOLUTION INTRAVENOUS at 11:45

## 2025-07-14 ASSESSMENT — ENCOUNTER SYMPTOMS
FALLS: 0
PALPITATIONS: 0
SHORTNESS OF BREATH: 0
DIZZINESS: 0

## 2025-07-14 ASSESSMENT — FIBROSIS 4 INDEX
FIB4 SCORE: 1.9
FIB4 SCORE: 1.9

## 2025-07-14 NOTE — RESEARCH NOTE
Name: Tawana Thomas   MRN: [id number]  Participant ID:  [id number]  : 1965  Visit Date/Time: 2025 10:00 AM      Study:    [id number] - [facility]   Status Consented/Enrolled (2023)   Active Start Date 23   Participant ID [id number]   Coordinator Lilia Fisher; Kate Zepeda; Lilia Casillas; Gina Hooks   IRB [id number]   NCT [id number]    Aaron Durán M.D.     Study Note:    Met with Carrie today for Month 24 study visit. She reports no problems with last injection, no AE/SAEs. She is going to get her lipids drawn SOC after the 1 month follow-up phone call when she is finished with the study.    JAQUI Langford,  conducted physical exam. See her note.  Lifestyle changes discussed including diet and exercise.      Vitals: BP OMRON reading right arm after sitting quietly for 5 minutes, 1-2 minutes apart  Sitting quietly starting 10:08am    Vitals:    25 1013 25 1015 25 1016   BP: 121/79 117/75 125/74   BP Location: Right arm     Patient Position: Sitting     Pulse: (!) 104 (!) 102 99   Weight: 88 kg (194 lb 0.1 oz)         Central Labs:      Labs drawn 25 at 8:20 am, processed and shipped per protocol.  Pt confirmed fasting for >10 hours.    Con Meds changes:  Metoprolol 25mg qd 25 and 25 prior to CCTA scan  Flonase nasal spray stopped 2024    Follow-up: F/U phone call 1 month

## 2025-07-14 NOTE — PROGRESS NOTES
Chief Complaint   Patient presents with    Other     Fv dx: Research study patient    Aortic Atherosclerosis          Subjective:   Tawana Thomas is a 60 y.o. female who presents today for 24 month evaluation for [facility] study.     Current medical problems include elevated CAC score, family history of premature CAD, HTN, DLD, TAA of 4.5cm in 2023. Has additional medical problems of hypothyroid, history of rheumatic fever.    She completed her end of study CCTA today. Her lipid profile remains blinded.     Tawana is doing well, she denies myalgias, skin reactions, exertional symptoms. Her primary has thought the mild AST and GGT elevation is due to the statin therapy. She has no abdominal symptoms.       Past Medical History:   Diagnosis Date    Asthma 1963    Childhood asthma    Bronchitis 2018    Cancer (HCC) 2015, 2023, 2024    Skin - two melanoma    Cataract 2019    Childhood asthma     Difficulty with CPAP use     History of mammogram     2012    Hyperlipidemia     Hypertension     Hypothyroidism     Insulin resistance     Menorrhagia     Sleep apnea          Family History   Problem Relation Age of Onset    Other Mother         Paget's disease of breast and alzheimer dementia    Heart Attack Mother         MI at 65    Cancer Mother     Other Father         MI    Hypertension Father     Heart Failure Brother     Other Other         Breast cancer (aunt)    Stroke Maternal Grandmother     Cancer Maternal Aunt          Social History     Tobacco Use    Smoking status: Never    Smokeless tobacco: Never   Vaping Use    Vaping status: Never Used   Substance Use Topics    Alcohol use: Yes     Alcohol/week: 4.2 oz     Types: 7 Glasses of wine per week     Comment: one glass of wine with dinner    Drug use: No         Allergies   Allergen Reactions    Penicillins     Sulfa Drugs          Current Outpatient Medications   Medication Sig    CALCIUM PO Take  by mouth.    levothyroxine (SYNTHROID) 100 MCG Tab TAKE  "ONE TABLET BY MOUTH EVERY DAY    amLODIPine (NORVASC) 2.5 MG Tab Take 1 Tablet by mouth every day. TAKE ONE TABLET BY MOUTH EVERY DAY MAY INCREASE TO TAKE TWO TABLETS BY MOUTH EVERY DAY IF BLOOD PRESSURE NOT LESS THAN 135/85 AFTER 7 TO 10 DAYS    atorvastatin (LIPITOR) 80 MG tablet TAKE ONE TABLET BY MOUTH EVERY EVENING    ezetimibe (ZETIA) 10 MG Tab TAKE ONE TABLET BY MOUTH EVERY DAY    hydroCHLOROthiazide 12.5 MG tablet Take 1 Tablet by mouth every day.    Cyanocobalamin (B-12 PO) Take 1 Tablet by mouth every day.    inclisiran sodium or PLACEBO (STUDY DRUG) 300 mg/1.5 mL injection Inject 300 mg under the skin every 6 months. Given in clinic    Probiotic Product (PROBIOTIC PO) Take  by mouth every day.    Cholecalciferol (VITAMIN D3) 2000 UNIT CAPS Take 1 Cap by mouth every day.    losartan (COZAAR) 100 MG Tab TAKE ONE TABLET BY MOUTH EVERY DAY         Review of Systems   HENT:  Negative for nosebleeds.    Respiratory:  Negative for shortness of breath.    Cardiovascular:  Negative for chest pain and palpitations.   Musculoskeletal:  Negative for falls.   Neurological:  Negative for dizziness.          Objective:   /72 (BP Location: Left arm, Patient Position: Sitting, BP Cuff Size: Adult)   Pulse (!) 108   Resp 16   Ht 1.651 m (5' 5\")   Wt 88 kg (194 lb)   SpO2 98%  Body mass index is 32.28 kg/m².        Complete Physical Exam  GENERAL APPEARANCE: Appears healthy.  Alert; in no acute distress.  Pleasant.,   HEAD: Normocephalic.   EYES: Anicteric, conjunctivae/corneas clear. ,   EARS: negative  NOSE: Nares normal. Septum midline.   THROAT: Lips, mucosa, and tongue normal. Teeth and gums normal.  NECK: Neck supple. No adenopathy.   BACK: no curvature. ROM normal.   LUNGS: Good diaphragmatic excursion. Lungs clear to auscultation bilaterally,   CARDIOVASCULAR: RRR. No murmurs, clicks, gallops, or rubs,   ABDOMEN: Abdomen soft, non-tender.  No masses,  No organomegaly,   EXTREMITIES: Extremities normal. No " deformities, edema, or skin discoloration,  PULSES: radial=4/4, dorsalis pedis=4/4,   SKIN: Skin color, texture, turgor normal. No rashes or lesions.,   LYMPH NODES: No palpable lymph nodes,   NEUROLOGIC: Gait normal.       Assessment:     1. Research study patient        2. Aortic atherosclerosis (HCC)  Lipid Profile      3. Family history of premature CAD        4. Hyperlipidemia, unspecified hyperlipidemia type  Lipid Profile               Medical Decision Making:  Today's Assessment / Plan:     All study assessment results reviewed.    Lifestyle changes discussed including diet and exercise.    Aortic atherosclerosis; elevated coronary artery calcium scoring; dyslipidemia; hypertension; premature family history CAD  -Continue aspirin and atorvastatin, Zetia.  Lipids are now monitored per study protocol which will be blinded to this provider.  We discussed lowering the dose of atorvastatin in the future and reassessing her AST.  -Blood pressure at goal in office today, a combination pill was not covered by her insurance.  -Continue [place] plaque research protocol.  Phone call in 1 month after this she can complete a lipid profile in our system    TTA 4.5cm  - Will review on most recent CTA       Return in about 6 months (around 1/14/2026) for follow up with me (or in [place] or [facility] if pt prefers).  Sooner if problems.

## 2025-07-14 NOTE — PROGRESS NOTES
CCTA Protocol Note  Absolute exclusions:    Severe aortic stenosis: N/A  Failed steroid preparation for contrast allergy: N/A    Relative exclusions reviewed with patient:    1st Degree AV Block: No  Severe COPD, asthma, bronchospasm, wheezing: No  Decompensated heart failure or pulmonary edema: No  Concurrent use of diltiazem, verapamil, or digoxin: No  Concurrent use of antiarrhythmic drugs, amiodarone, flecainide, procainamide, or dofetilide: No  Concurrent use of sildenafil, vardenafil or tadalafil: No  Severe Cardiac conduction abnormalities: No  GFR less than 30: No  Mobid Obesity (BMI greater than 50kg/m2): obese  Caffeine consumption with the previous 12 hours: No  NPO for at least 4 hours: Yes  Baseline Vital Signs: Yes  3 lead EKG performed, and strip printed to be scanned into chart: Yes, Sinus rhythm    .      Patient transported back to exam room.   Patient brought to preparation room.   Verbal consent given by patient for PIV and administration of Nitroglycerin by RN.  PIV initiated, 20 LAC    Review of medications, protocol, and NPO status completed.   Education provided to patient regarding examination.  Patient given baseline 3 lead EKG:   Vitals: 0739  BP: 148/83  HR: 71  RR: 16   97% RA     Patient ready for CT scan  Vitals: 0800  BP: 153/83  HR: 66  RR: 16  96% RA    Administration of Sublingual Nitroglycerin given per CCTA protocol at 0804--See MAR  Vitals: 0804  BP: 138/81  HR: 68  RR: 16  96% RA    Post Nitro Vitals: 0808  BP: 131/71  HR: 73  RR: 16  95% RA    END Vitals: 0818  BP: 119/69  HR: 80  RR: 16  94% RA    Exam completed 0825    Patient returned to preparation area where post procedure education given.  Patient provided with water.     Vitals returned to baseline. Patient states they are ready to go home. Discharge education provided. Discharge criteria met per CCTA protocol.   PIV removed 0825     Patient is ambulatory/wheelchair, escorted by RN to [facility] to meet ride home.

## 2025-08-13 ENCOUNTER — RESEARCH ENCOUNTER (OUTPATIENT)
Dept: CARDIOLOGY | Facility: MEDICAL CENTER | Age: 60
End: 2025-08-13
Payer: COMMERCIAL

## 2025-08-26 ENCOUNTER — RESULTS FOLLOW-UP (OUTPATIENT)
Dept: CARDIOLOGY | Facility: MEDICAL CENTER | Age: 60
End: 2025-08-26
Payer: COMMERCIAL